# Patient Record
Sex: MALE | Race: OTHER | HISPANIC OR LATINO
[De-identification: names, ages, dates, MRNs, and addresses within clinical notes are randomized per-mention and may not be internally consistent; named-entity substitution may affect disease eponyms.]

---

## 2017-07-25 ENCOUNTER — APPOINTMENT (OUTPATIENT)
Dept: ORTHOPEDIC SURGERY | Facility: CLINIC | Age: 47
End: 2017-07-25

## 2017-07-25 DIAGNOSIS — M47.816 SPONDYLOSIS W/OUT MYELOPATHY OR RADICULOPATHY, LUMBAR REGION: ICD-10-CM

## 2017-08-07 ENCOUNTER — APPOINTMENT (OUTPATIENT)
Dept: ORTHOPEDIC SURGERY | Facility: CLINIC | Age: 47
End: 2017-08-07

## 2017-11-07 ENCOUNTER — APPOINTMENT (OUTPATIENT)
Dept: PULMONOLOGY | Facility: CLINIC | Age: 47
End: 2017-11-07
Payer: MEDICAID

## 2017-11-07 VITALS
WEIGHT: 281 LBS | BODY MASS INDEX: 46.76 KG/M2 | OXYGEN SATURATION: 97 % | SYSTOLIC BLOOD PRESSURE: 120 MMHG | HEART RATE: 74 BPM | DIASTOLIC BLOOD PRESSURE: 84 MMHG

## 2017-11-07 DIAGNOSIS — Z01.811 ENCOUNTER FOR PREPROCEDURAL RESPIRATORY EXAMINATION: ICD-10-CM

## 2017-11-07 PROCEDURE — 94729 DIFFUSING CAPACITY: CPT

## 2017-11-07 PROCEDURE — 94010 BREATHING CAPACITY TEST: CPT

## 2017-11-07 PROCEDURE — 94727 GAS DIL/WSHOT DETER LNG VOL: CPT

## 2017-11-07 PROCEDURE — 99214 OFFICE O/P EST MOD 30 MIN: CPT | Mod: 25

## 2017-11-07 PROCEDURE — 85018 HEMOGLOBIN: CPT | Mod: QW

## 2018-05-22 ENCOUNTER — APPOINTMENT (OUTPATIENT)
Dept: PULMONOLOGY | Facility: CLINIC | Age: 48
End: 2018-05-22

## 2019-05-30 ENCOUNTER — OTHER (OUTPATIENT)
Age: 49
End: 2019-05-30

## 2019-05-30 DIAGNOSIS — M25.569 PAIN IN UNSPECIFIED KNEE: ICD-10-CM

## 2019-06-06 ENCOUNTER — OTHER (OUTPATIENT)
Age: 49
End: 2019-06-06

## 2019-06-06 ENCOUNTER — APPOINTMENT (OUTPATIENT)
Dept: ORTHOPEDIC SURGERY | Facility: CLINIC | Age: 49
End: 2019-06-06
Payer: MEDICAID

## 2019-06-06 VITALS
HEIGHT: 65 IN | BODY MASS INDEX: 46.82 KG/M2 | DIASTOLIC BLOOD PRESSURE: 79 MMHG | WEIGHT: 281 LBS | SYSTOLIC BLOOD PRESSURE: 133 MMHG | HEART RATE: 60 BPM

## 2019-06-06 PROCEDURE — 99203 OFFICE O/P NEW LOW 30 MIN: CPT | Mod: 25

## 2019-06-06 PROCEDURE — S0020: CPT

## 2019-06-06 PROCEDURE — 20610 DRAIN/INJ JOINT/BURSA W/O US: CPT

## 2019-06-06 PROCEDURE — 73564 X-RAY EXAM KNEE 4 OR MORE: CPT

## 2019-06-06 NOTE — PHYSICAL EXAM
[de-identified] : The patient appears well nourished  and in no apparent distress.  The patient is alert and oriented to person, place, and time.   Affect and mood appear normal. The head is normocephalic and atraumatic.  The eyes reveal normal sclera and extra ocular muscles are intact. The tongue is midline with no apparent lesions.  Skin shows normal turgor with no evidence of eczema or psoriasis.  No respiratory distress noted.  Sensation grossly intact.\par   [de-identified] : Exam of the left knee shows a small effusion, full extension, varus alignment, flexion of 105 degrees.  5/5 motor strength bilaterally distally. Sensation intact distally.  [de-identified] : Xray- 4 views of the left knee shows moderate medial compartment arthritis of the left knee.

## 2019-06-06 NOTE — PROCEDURE
[de-identified] : Using sterile technique, 2cc of depomedrol 40mg/ml, 4cc of 1% plain lidocaine, and 2 cc 0.25% marcaine was drawn up into a sterile syringe. The left knee was then sterilely prepped with chlorhexidine. Ethyl chloride spray was used to anesthetize the skin and subQ tissue. The depomedrol/lidocaine/marcaine mixture was then injected into the knee joint in the anterolateral position. The patient tolerated the procedure well without difficulty. The patient was given instructions on the use of ice and anti-inflammatories post injection site soreness.\par \par

## 2019-06-06 NOTE — ADDENDUM
[FreeTextEntry1] : This note was authored by Dhruv Doran working as a medical scribe for Dr. Leonardo Farrell. The note was reviewed, edited, and revised by Dr. Leonardo Farrell whom is in agreement with the exam findings, imaging findings, and treatment plan. 06/06/2019.

## 2019-06-06 NOTE — DISCUSSION/SUMMARY
[de-identified] : The patient is a 49 year old male with moderate medial compartment arthritis of the left knee. Conservative options were discussed. He was given a cortisone injection in the left knee today. He was given a prescription for physical therapy. We discussed the future possibility for Visco Supplementation if pain does not respond to cortisone, PT and NSAIDs. He was recommended to take ibuprofen as needed for painful flares. He may follow up as needed.

## 2019-06-06 NOTE — HISTORY OF PRESENT ILLNESS
[de-identified] : The patient is a 49 year old male being seen for evaluation of his left knee. He denies injury, trauma, or change of activity. He reports a three week duration of pain. He reports pain is most notable over the medial aspect of the knee. He reports pain is worse with weightbearing. He reports icing the knee with minimal relief. He reports taking ibuprofen with moderate relief. He comes in today for evaluation of his left knee and for treatment options.

## 2019-06-11 ENCOUNTER — OTHER (OUTPATIENT)
Age: 49
End: 2019-06-11

## 2019-07-24 ENCOUNTER — APPOINTMENT (OUTPATIENT)
Dept: PULMONOLOGY | Facility: CLINIC | Age: 49
End: 2019-07-24
Payer: MEDICAID

## 2019-07-24 VITALS — SYSTOLIC BLOOD PRESSURE: 118 MMHG | DIASTOLIC BLOOD PRESSURE: 78 MMHG | OXYGEN SATURATION: 95 % | HEART RATE: 72 BPM

## 2019-07-24 VITALS — HEIGHT: 65 IN | BODY MASS INDEX: 44.48 KG/M2 | WEIGHT: 267 LBS

## 2019-07-24 PROCEDURE — 99407 BEHAV CHNG SMOKING > 10 MIN: CPT

## 2019-07-24 PROCEDURE — 99214 OFFICE O/P EST MOD 30 MIN: CPT

## 2019-09-25 ENCOUNTER — APPOINTMENT (OUTPATIENT)
Dept: PULMONOLOGY | Facility: CLINIC | Age: 49
End: 2019-09-25

## 2020-02-10 ENCOUNTER — APPOINTMENT (OUTPATIENT)
Dept: ORTHOPEDIC SURGERY | Facility: CLINIC | Age: 50
End: 2020-02-10
Payer: MEDICAID

## 2020-02-10 VITALS
SYSTOLIC BLOOD PRESSURE: 117 MMHG | BODY MASS INDEX: 44.48 KG/M2 | HEART RATE: 77 BPM | DIASTOLIC BLOOD PRESSURE: 77 MMHG | WEIGHT: 267 LBS | HEIGHT: 65 IN

## 2020-02-10 PROCEDURE — 99213 OFFICE O/P EST LOW 20 MIN: CPT | Mod: 25

## 2020-02-10 PROCEDURE — 73564 X-RAY EXAM KNEE 4 OR MORE: CPT | Mod: 26,LT

## 2020-02-10 PROCEDURE — 20610 DRAIN/INJ JOINT/BURSA W/O US: CPT | Mod: LT

## 2020-02-10 NOTE — PHYSICAL EXAM
[de-identified] : The patient appears well nourished  and in no apparent distress.  The patient is alert and oriented to person, place, and time.   Affect and mood appear normal. The head is normocephalic and atraumatic.  The eyes reveal normal sclera and extra ocular muscles are intact. The tongue is midline with no apparent lesions.  Skin shows normal turgor with no evidence of eczema or psoriasis.  No respiratory distress noted.  Sensation grossly intact.\par   [de-identified] : Xray- 4 views of the left knee shows moderate medial compartment arthritis of the left knee. [de-identified] : Exam of the left knee shows a small effusion, full extension, varus alignment, flexion of 105 degrees.  No gross instability. Extensor mechanism is intact. Quadriceps strength is adequate.5/5 motor strength bilaterally distally. Sensation intact distally.

## 2020-02-10 NOTE — HISTORY OF PRESENT ILLNESS
[de-identified] : Patient is a 49-year-old male who presents for followup of his left knee. He was seen in the office on June 6, 2019 for moderate left knee osteoarthritis. He was given an intra-articular cortisone injection which offered 3-4 months of relief. He reports performing physical therapy but did not continue with his home exercise program. Pain has now returned to the knee. Pain is notable of the medial aspect of the knee with radiation posteriorly. He's been taking Tylenol with no significant relief.

## 2020-02-10 NOTE — PROCEDURE
[de-identified] : Using sterile technique, 2cc of depomedrol 40mg/ml, 4cc of 1% plain lidocaine, and 2 cc 0.25% marcaine was drawn up into a sterile syringe.  The left knee was then sterilely prepped with chlorhexidine. Ethyl chloride spray was used to anesthesize the skin and subQ tissue.  The depomedrol/lidocaine/marcaine mixture was then injected into the knee joint in the anterolateral position.  The patient tolerated the procedure well without difficulty.  The patient was given instructions on the use of ice and anti-inflammatories post injection site soreness

## 2020-02-10 NOTE — DISCUSSION/SUMMARY
[de-identified] : The patient is a 49-year-old male with moderate medial compartment arthritis of the left knee. Conservative options were discussed. We had a long discussion on the importance of weight loss. His knee was injected with cortisone today for pain relief.I recommended a course of Mobic.  The patient was given a prescription for the Mobic with directions.  They were instructed to stop the medicine and call the office if there are any adverse reaction to the medicine.  They were also instructed to consult with their primary care doctor prior to starting the medication. We discussed Visco supplementation and this was ordered today. I recommended physical therapy and a prescription was provided. He was made aware of any changes in his clinical condition that would warrant urgent evaluation of intervention. He will followup once we've received his Visco supplementation. He notes good understanding and agreement with the plan of care.

## 2020-08-20 ENCOUNTER — APPOINTMENT (OUTPATIENT)
Dept: ORTHOPEDIC SURGERY | Facility: CLINIC | Age: 50
End: 2020-08-20
Payer: MEDICAID

## 2020-08-20 VITALS
HEIGHT: 65 IN | DIASTOLIC BLOOD PRESSURE: 85 MMHG | BODY MASS INDEX: 44.48 KG/M2 | HEART RATE: 85 BPM | WEIGHT: 267 LBS | SYSTOLIC BLOOD PRESSURE: 124 MMHG

## 2020-08-20 PROCEDURE — 73564 X-RAY EXAM KNEE 4 OR MORE: CPT | Mod: LT

## 2020-08-20 PROCEDURE — 99214 OFFICE O/P EST MOD 30 MIN: CPT | Mod: 25

## 2020-08-20 PROCEDURE — 20610 DRAIN/INJ JOINT/BURSA W/O US: CPT | Mod: LT

## 2020-08-20 NOTE — DISCUSSION/SUMMARY
[de-identified] : Patient is a 50-year-old male with left knee moderate to severe osteoarthritis.  Conservative options were discussed.  Patient received a Depo-Medrol with lidocaine injection to the left knee today in office using sterile technique, and tolerated well.  He was advised on proper ice and elevation at home.  I have ordered Visco supplementation for the left knee as well.  He was given a prescription for physical therapy.  A prescription for meloxicam was provided for an anti-inflammatory.  Patient advised to call back if he does not hear from us in regards to the Visco supplementation.  Follow-up recommended once gel series is approved.\par \par

## 2020-08-20 NOTE — HISTORY OF PRESENT ILLNESS
[de-identified] : Patient is a 50-year-old male presenting for follow-up evaluation of left knee pain.  He has had longstanding left knee pain which is now progressively worsening.  He generalizes the left knee pain to the anterior, medial, and lateral aspect of the knee.  He admits to intermittent swelling of the knee.  He denies locking or buckling.  He notes that his symptoms are worse with weight-bear activity including walking any distance, rising proceed position, and using stairs.  Patient states he likes to play soccer and due to his pain he is unable to do so.  He feels that his pain is limiting his quality of life.  He in the past is been diagnosed with left knee osteoarthritis and has been treated conservatively.  He has tried physical therapy without significant relief.  He is tried anti-inflammatories and Tylenol without significant relief.  He has received steroid injections in the past which have worked seem to wear off after a few weeks.  He wishes to receive another steroid injection today as he is in a lot of pain but would like to try to order the gel shots as he has not tried these yet.\par Patient denies any diabetes, heart issues, or lung issues.

## 2020-08-20 NOTE — PHYSICAL EXAM
[de-identified] : Multi body exam \par The patient appears well nourished and in no apparent distress. The patient is alert and oriented to person, place, and time. Affect and mood appear normal. The head is normocephalic and atraumatic. The eyes reveal normal sclera and extra ocular muscles are intact. The tongue is midline with no apparent lesions. Skin shows normal turgor with no evidence of eczema or psoriasis. No respiratory distress noted. Sensation grossly intact.\par   [de-identified] : Exam of left knee: Skin is from limits, range of motion of the knee is 0-110 with pain at deep flexion.  There is moderate swelling of the knee.  There is positive Vazquez's test.

## 2020-08-20 NOTE — PROCEDURE
[de-identified] : Using sterile technique, 2cc of depomedrol 40mg/ml, 4cc of 1% plain lidocaine, and 2 cc 0.25% marcaine was drawn up into a sterile syringe. The left knee was then sterilely prepped with chlorhexidine. Ethyl chloride spray was used to anesthetize the skin and subQ tissue. The depomedrol/lidocaine/marcaine mixture was then injected into the knee joint in the anterolateral position. The patient tolerated the procedure well without difficulty. The patient was given instructions on the use of ice and anti-inflammatories post injection site soreness.

## 2020-10-23 ENCOUNTER — APPOINTMENT (OUTPATIENT)
Dept: ORTHOPEDIC SURGERY | Facility: CLINIC | Age: 50
End: 2020-10-23
Payer: MEDICAID

## 2020-10-23 VITALS — HEIGHT: 65 IN | BODY MASS INDEX: 44.48 KG/M2 | WEIGHT: 267 LBS

## 2020-10-23 PROCEDURE — 20610 DRAIN/INJ JOINT/BURSA W/O US: CPT | Mod: LT

## 2020-10-23 PROCEDURE — 99213 OFFICE O/P EST LOW 20 MIN: CPT | Mod: 25

## 2020-10-23 PROCEDURE — 99072 ADDL SUPL MATRL&STAF TM PHE: CPT

## 2020-10-23 NOTE — DISCUSSION/SUMMARY
[de-identified] : Patient is a 50-year-old male with left knee osteoarthritis.  Consider options were discussed.  Patient received the first of 3 Euflexxa injections to the left knee today in office under sterile technique and tolerated well.  Patient will ice and elevate at home.  All questions were answered.  Follow-up recommended in 1 week for second injection.

## 2020-10-23 NOTE — HISTORY OF PRESENT ILLNESS
[de-identified] : Patient is a 50-year-old male presenting for follow-up evaluation of left knee pain.  He has had longstanding left knee pain which is now progressively worsening.  He generalizes the left knee pain to the anterior, medial, and lateral aspect of the knee.  He admits to intermittent swelling of the knee.  He denies locking or buckling.  He notes that his symptoms are worse with weight-bear activity including walking any distance, rising proceed position, and using stairs.  Patient states he likes to play soccer and due to his pain he is unable to do so.  He feels that his pain is limiting his quality of life.  He in the past is been diagnosed with left knee osteoarthritis and has been treated conservatively.  He has tried physical therapy without significant relief.  He is tried anti-inflammatories and Tylenol without significant relief. Patient was seen in office two months ago with the same complaints and received a depo-medrol injection at that time to the left knee with some improvement. Gel injections were also ordered and patient presents today for the first of the series.

## 2020-10-23 NOTE — REASON FOR VISIT
[Follow-Up Visit] : a follow-up visit for [Other: ____] : [unfilled] [FreeTextEntry2] : left knee Euflexxa inj#1. Lot# [id number], Expires on 2021/06/07.

## 2020-10-23 NOTE — PHYSICAL EXAM
[de-identified] : Multi body exam \par The patient appears well nourished and in no apparent distress. The patient is alert and oriented to person, place, and time. Affect and mood appear normal. The head is normocephalic and atraumatic. The eyes reveal normal sclera and extra ocular muscles are intact. The tongue is midline with no apparent lesions. Skin shows normal turgor with no evidence of eczema or psoriasis. No respiratory distress noted. Sensation grossly intact.\par   [de-identified] : Exam of left knee: Skin is from limits, range of motion of the knee is 0-110 with pain at deep flexion. There is moderate swelling of the knee.  There is positive Vazquez's test. [de-identified] : X

## 2020-10-23 NOTE — PROCEDURE
[de-identified] : Allergies: The patient denies allergies to medications and has no allergies to chicken,eggs, or feathers.\par Procedure: The patient has been identified by name and date of birth. Patient confirms that we are treating the left knee today.\par The knee was prepped in the usual sterile fashion. The areas were cleansed with chlorhexadine, then sprayed with ethyl chloride. The patient was then injected with the Euflexxa into the left knee. The patient tolerated the procedure well. The medication was delivered aseptically and atraumatically.\par Diagnosis: Osteoarthritis of the left knee\par Treatment: The patient was advised on the activities for today. I gave the patient instructions on postinjection ice and analgesia.\par

## 2020-10-29 ENCOUNTER — APPOINTMENT (OUTPATIENT)
Dept: ORTHOPEDIC SURGERY | Facility: CLINIC | Age: 50
End: 2020-10-29
Payer: MEDICAID

## 2020-10-29 VITALS — WEIGHT: 267 LBS | HEIGHT: 65 IN | BODY MASS INDEX: 44.48 KG/M2

## 2020-10-29 PROCEDURE — 20610 DRAIN/INJ JOINT/BURSA W/O US: CPT | Mod: LT

## 2020-10-29 PROCEDURE — 99072 ADDL SUPL MATRL&STAF TM PHE: CPT

## 2020-11-05 ENCOUNTER — APPOINTMENT (OUTPATIENT)
Dept: ORTHOPEDIC SURGERY | Facility: CLINIC | Age: 50
End: 2020-11-05
Payer: MEDICAID

## 2020-11-05 VITALS
BODY MASS INDEX: 44.48 KG/M2 | WEIGHT: 267 LBS | DIASTOLIC BLOOD PRESSURE: 82 MMHG | HEIGHT: 65 IN | SYSTOLIC BLOOD PRESSURE: 122 MMHG | HEART RATE: 82 BPM

## 2020-11-05 PROCEDURE — 99072 ADDL SUPL MATRL&STAF TM PHE: CPT

## 2020-11-05 PROCEDURE — 20610 DRAIN/INJ JOINT/BURSA W/O US: CPT | Mod: LT

## 2020-11-05 NOTE — REASON FOR VISIT
[Follow-Up Visit] : a follow-up visit for [Other: ____] : [unfilled] [FreeTextEntry2] : left knee Euflexxa inj#3. Lot# [id number], Expires on 2021/06/07. \par

## 2020-11-05 NOTE — REASON FOR VISIT
[Follow-Up Visit] : a follow-up visit for [Other: ____] : [unfilled] [FreeTextEntry2] : left knee Euflexxa inj#2. Lot# [id number], Expires on 2021/06/07. \par

## 2020-11-05 NOTE — HISTORY OF PRESENT ILLNESS
[de-identified] : The patient is here today for a Euflexxa injection for the left knee. The patient is having osteoarthritic symptoms. The patient was seen previously and was indicated for Euflexxa injections.\par Allergies: The patient denies allergies to medications and has no allergies to chicken,eggs, or feathers.\par Procedure: The patient has been identified by name and date of birth. Patient confirms that we are treating the left knee today.\par The knee was prepped in the usual sterile fashion. The areas were cleansed with chlorhexadine, then sprayed with ethyl chloride. The patient was then injected with the Euflexxa into the left knee. The patient tolerated the procedure well. The medication was delivered aseptically and atraumatically.\par Diagnosis: Osteoarthritis of the left knee\par Treatment: The patient was advised on the activities for today. I gave the patient instructions on postinjection ice and analgesia.\par  Follow up recommended in 6 weeks.

## 2020-11-05 NOTE — HISTORY OF PRESENT ILLNESS
[de-identified] : The patient is here today for a Euflexxa injection for the left knee. The patient is having osteoarthritic symptoms. The patient was seen previously and was indicated for Euflexxa injections.\par Allergies: The patient denies allergies to medications and has no allergies to chicken,eggs, or feathers.\par Procedure: The patient has been identified by name and date of birth. Patient confirms that we are treating the left knee today.\par The knee was prepped in the usual sterile fashion. The areas were cleansed with chlorhexadine, then sprayed with ethyl chloride. The patient was then injected with the Euflexxa into the left knee. The patient tolerated the procedure well. The medication was delivered aseptically and atraumatically.\par Diagnosis: Osteoarthritis of the left knee\par Treatment: The patient was advised on the activities for today. I gave the patient instructions on postinjection ice and analgesia.\par  Follow up recommended in one week.

## 2020-12-18 ENCOUNTER — APPOINTMENT (OUTPATIENT)
Dept: ORTHOPEDIC SURGERY | Facility: CLINIC | Age: 50
End: 2020-12-18
Payer: MEDICAID

## 2020-12-18 VITALS — WEIGHT: 267 LBS | BODY MASS INDEX: 44.48 KG/M2 | HEIGHT: 65 IN

## 2020-12-18 DIAGNOSIS — M17.12 UNILATERAL PRIMARY OSTEOARTHRITIS, LEFT KNEE: ICD-10-CM

## 2020-12-18 PROCEDURE — 99215 OFFICE O/P EST HI 40 MIN: CPT

## 2020-12-18 PROCEDURE — 99072 ADDL SUPL MATRL&STAF TM PHE: CPT

## 2020-12-18 RX ORDER — HYALURONATE SODIUM 20 MG/2 ML
20 SYRINGE (ML) INTRAARTICULAR
Qty: 1 | Refills: 0 | Status: DISCONTINUED | OUTPATIENT
Start: 2020-02-10 | End: 2020-12-18

## 2020-12-18 RX ORDER — HYALURONATE SODIUM 20 MG/2 ML
20 SYRINGE (ML) INTRAARTICULAR
Qty: 1 | Refills: 0 | Status: DISCONTINUED | OUTPATIENT
Start: 2020-08-20 | End: 2020-12-18

## 2020-12-18 RX ORDER — MELOXICAM 7.5 MG/1
7.5 TABLET ORAL TWICE DAILY
Qty: 60 | Refills: 0 | Status: DISCONTINUED | COMMUNITY
Start: 2020-02-10 | End: 2020-12-18

## 2020-12-18 NOTE — PHYSICAL EXAM
[de-identified] : The patient appears well nourished  and in no apparent distress.  The patient is alert and oriented to person, place, and time.   Affect and mood appear normal. The head is normocephalic and atraumatic.  The eyes reveal normal sclera and extra ocular muscles are intact. The tongue is midline with no apparent lesions.  Skin shows normal turgor with no evidence of eczema or psoriasis.  No respiratory distress noted.  Sensation grossly intact.\par   [de-identified] : Exam of the left knee shows -10 to 108 degrees of flexion measured with a goniometer. 5/5 motor strength bilaterally distally. Sensation intact distally.  [de-identified] : Xray- 4 views of the left knee shows bone on bone varus arthritis of the left knee.

## 2020-12-18 NOTE — DISCUSSION/SUMMARY
[de-identified] : The patient is a 50 year old male with bone on bone varus arthritis of the left knee. Based upon the patients continued symptoms and failure to respond to conservative treatment I have recommended a left total knee replacement for the patient.  A discussion was had with the patient regarding a left total knee replacement. A long discussion was had with the patient as what the total joint replacement would entail. A model was used to demonstrate the operation and to discuss bearing surfaces of the implants. The hospitalization and rehabilitation were discussed.  The use of perioperative antibiotics and DVT prophylaxis were discussed. The risks, benefits and alternatives to surgical intervention were discussed at length with the patient. Specific risks discussed included: infection, wound breakdown, numbness and damage to nerves, tendon, muscle, arteries or other blood vessels. The possibility of recurrent pain, no improvement in pain and actual worsening of the pain were also mentioned in conversation with the patient. Medical complications related to the patient's general medical health including deep vein thrombosis, pulmonary embolus, heart attack, stroke, death and other complications from anesthesia were discussed as well. The patient was told that we will take steps to minimize these risks by using sterile technique, antibiotics and DVT prophylaxis when appropriate and following the patient postoperatively in the clinic setting to monitor progress. The benefits of surgery were discussed with the patient including the potential to improve the current clinical condition through operative intervention. Alternatives to surgical intervention include continued conservative management which may yield less than optimal results in this particular patient. All questions were answered to the satisfaction of the patient. Models were used as an educational tool. We did discuss implant choices and fixation, with shared decision making with the patient. \par \par  We discussed that his knee replacement will be done with robotic assistance to enhance accuracy and dynamic joint balancing. \par \par I had a long discussion with the patient regarding increased risk of perioperative complications with joint replacement in patients with obesity.  We discussed that this may include but is not limited to wound healing complications, DVT and PE, and increased infection rate.  At this point the patient's pain is debilitating and it is significantly limiting their daily function and quality of life.  The patient acknowledges this and is willing to accept this increased risk to proceed with joint replacement surgery.

## 2020-12-18 NOTE — ADDENDUM
[FreeTextEntry1] : This note was authored by Dhruv Doran working as a medical scribe for Dr. Leonardo Farrell. The note was reviewed, edited, and revised by Dr. Leonardo Farrell whom is in agreement with the exam findings, imaging findings, and treatment plan. 12/18/2020.

## 2020-12-29 ENCOUNTER — OUTPATIENT (OUTPATIENT)
Dept: OUTPATIENT SERVICES | Facility: HOSPITAL | Age: 50
LOS: 1 days | End: 2020-12-29
Payer: MEDICAID

## 2020-12-29 ENCOUNTER — APPOINTMENT (OUTPATIENT)
Dept: CT IMAGING | Facility: CLINIC | Age: 50
End: 2020-12-29
Payer: MEDICAID

## 2020-12-29 DIAGNOSIS — M17.12 UNILATERAL PRIMARY OSTEOARTHRITIS, LEFT KNEE: ICD-10-CM

## 2020-12-29 DIAGNOSIS — Z98.89 OTHER SPECIFIED POSTPROCEDURAL STATES: Chronic | ICD-10-CM

## 2020-12-29 PROCEDURE — 73700 CT LOWER EXTREMITY W/O DYE: CPT

## 2020-12-29 PROCEDURE — 73700 CT LOWER EXTREMITY W/O DYE: CPT | Mod: 26,LT

## 2021-01-19 ENCOUNTER — OUTPATIENT (OUTPATIENT)
Dept: OUTPATIENT SERVICES | Facility: HOSPITAL | Age: 51
LOS: 1 days | End: 2021-01-19
Payer: MEDICAID

## 2021-01-19 ENCOUNTER — RESULT REVIEW (OUTPATIENT)
Age: 51
End: 2021-01-19

## 2021-01-19 VITALS
RESPIRATION RATE: 20 BRPM | DIASTOLIC BLOOD PRESSURE: 82 MMHG | HEART RATE: 60 BPM | HEIGHT: 64 IN | TEMPERATURE: 98 F | SYSTOLIC BLOOD PRESSURE: 124 MMHG | WEIGHT: 277.34 LBS

## 2021-01-19 DIAGNOSIS — Z98.84 BARIATRIC SURGERY STATUS: Chronic | ICD-10-CM

## 2021-01-19 DIAGNOSIS — Z98.890 OTHER SPECIFIED POSTPROCEDURAL STATES: Chronic | ICD-10-CM

## 2021-01-19 DIAGNOSIS — Z01.818 ENCOUNTER FOR OTHER PREPROCEDURAL EXAMINATION: ICD-10-CM

## 2021-01-19 DIAGNOSIS — M19.90 UNSPECIFIED OSTEOARTHRITIS, UNSPECIFIED SITE: ICD-10-CM

## 2021-01-19 DIAGNOSIS — G47.30 SLEEP APNEA, UNSPECIFIED: ICD-10-CM

## 2021-01-19 DIAGNOSIS — Z29.9 ENCOUNTER FOR PROPHYLACTIC MEASURES, UNSPECIFIED: ICD-10-CM

## 2021-01-19 DIAGNOSIS — Z98.89 OTHER SPECIFIED POSTPROCEDURAL STATES: Chronic | ICD-10-CM

## 2021-01-19 DIAGNOSIS — Z13.89 ENCOUNTER FOR SCREENING FOR OTHER DISORDER: ICD-10-CM

## 2021-01-19 LAB
A1C WITH ESTIMATED AVERAGE GLUCOSE RESULT: 5.7 % — HIGH (ref 4–5.6)
ALBUMIN SERPL ELPH-MCNC: 4.1 G/DL — SIGNIFICANT CHANGE UP (ref 3.3–5.2)
ALP SERPL-CCNC: 60 U/L — SIGNIFICANT CHANGE UP (ref 40–120)
ALT FLD-CCNC: 39 U/L — SIGNIFICANT CHANGE UP
ANION GAP SERPL CALC-SCNC: 8 MMOL/L — SIGNIFICANT CHANGE UP (ref 5–17)
APTT BLD: 30.7 SEC — SIGNIFICANT CHANGE UP (ref 27.5–35.5)
AST SERPL-CCNC: 27 U/L — SIGNIFICANT CHANGE UP
BASOPHILS # BLD AUTO: 0.05 K/UL — SIGNIFICANT CHANGE UP (ref 0–0.2)
BASOPHILS NFR BLD AUTO: 0.6 % — SIGNIFICANT CHANGE UP (ref 0–2)
BILIRUB SERPL-MCNC: 0.2 MG/DL — LOW (ref 0.4–2)
BLD GP AB SCN SERPL QL: SIGNIFICANT CHANGE UP
BUN SERPL-MCNC: 13 MG/DL — SIGNIFICANT CHANGE UP (ref 8–20)
CALCIUM SERPL-MCNC: 9.3 MG/DL — SIGNIFICANT CHANGE UP (ref 8.6–10.2)
CHLORIDE SERPL-SCNC: 103 MMOL/L — SIGNIFICANT CHANGE UP (ref 98–107)
CO2 SERPL-SCNC: 30 MMOL/L — HIGH (ref 22–29)
CREAT SERPL-MCNC: 0.86 MG/DL — SIGNIFICANT CHANGE UP (ref 0.5–1.3)
EOSINOPHIL # BLD AUTO: 0.31 K/UL — SIGNIFICANT CHANGE UP (ref 0–0.5)
EOSINOPHIL NFR BLD AUTO: 3.6 % — SIGNIFICANT CHANGE UP (ref 0–6)
ESTIMATED AVERAGE GLUCOSE: 117 MG/DL — HIGH (ref 68–114)
GLUCOSE SERPL-MCNC: 104 MG/DL — HIGH (ref 70–99)
HCT VFR BLD CALC: 46.9 % — SIGNIFICANT CHANGE UP (ref 39–50)
HGB BLD-MCNC: 15.4 G/DL — SIGNIFICANT CHANGE UP (ref 13–17)
IMM GRANULOCYTES NFR BLD AUTO: 0.9 % — SIGNIFICANT CHANGE UP (ref 0–1.5)
INR BLD: 1.03 RATIO — SIGNIFICANT CHANGE UP (ref 0.88–1.16)
LYMPHOCYTES # BLD AUTO: 2.99 K/UL — SIGNIFICANT CHANGE UP (ref 1–3.3)
LYMPHOCYTES # BLD AUTO: 34.9 % — SIGNIFICANT CHANGE UP (ref 13–44)
MCHC RBC-ENTMCNC: 28.4 PG — SIGNIFICANT CHANGE UP (ref 27–34)
MCHC RBC-ENTMCNC: 32.8 GM/DL — SIGNIFICANT CHANGE UP (ref 32–36)
MCV RBC AUTO: 86.4 FL — SIGNIFICANT CHANGE UP (ref 80–100)
MONOCYTES # BLD AUTO: 0.62 K/UL — SIGNIFICANT CHANGE UP (ref 0–0.9)
MONOCYTES NFR BLD AUTO: 7.2 % — SIGNIFICANT CHANGE UP (ref 2–14)
MRSA PCR RESULT.: SIGNIFICANT CHANGE UP
NEUTROPHILS # BLD AUTO: 4.52 K/UL — SIGNIFICANT CHANGE UP (ref 1.8–7.4)
NEUTROPHILS NFR BLD AUTO: 52.8 % — SIGNIFICANT CHANGE UP (ref 43–77)
PLATELET # BLD AUTO: 181 K/UL — SIGNIFICANT CHANGE UP (ref 150–400)
POTASSIUM SERPL-MCNC: 4.7 MMOL/L — SIGNIFICANT CHANGE UP (ref 3.5–5.3)
POTASSIUM SERPL-SCNC: 4.7 MMOL/L — SIGNIFICANT CHANGE UP (ref 3.5–5.3)
PROT SERPL-MCNC: 7.3 G/DL — SIGNIFICANT CHANGE UP (ref 6.6–8.7)
PROTHROM AB SERPL-ACNC: 11.9 SEC — SIGNIFICANT CHANGE UP (ref 10.6–13.6)
RBC # BLD: 5.43 M/UL — SIGNIFICANT CHANGE UP (ref 4.2–5.8)
RBC # FLD: 12.9 % — SIGNIFICANT CHANGE UP (ref 10.3–14.5)
S AUREUS DNA NOSE QL NAA+PROBE: SIGNIFICANT CHANGE UP
SODIUM SERPL-SCNC: 141 MMOL/L — SIGNIFICANT CHANGE UP (ref 135–145)
WBC # BLD: 8.57 K/UL — SIGNIFICANT CHANGE UP (ref 3.8–10.5)
WBC # FLD AUTO: 8.57 K/UL — SIGNIFICANT CHANGE UP (ref 3.8–10.5)

## 2021-01-19 PROCEDURE — 93005 ELECTROCARDIOGRAM TRACING: CPT

## 2021-01-19 PROCEDURE — 71046 X-RAY EXAM CHEST 2 VIEWS: CPT | Mod: 26

## 2021-01-19 PROCEDURE — G0463: CPT

## 2021-01-19 PROCEDURE — 71046 X-RAY EXAM CHEST 2 VIEWS: CPT

## 2021-01-19 PROCEDURE — 93010 ELECTROCARDIOGRAM REPORT: CPT

## 2021-01-19 RX ORDER — SODIUM CHLORIDE 9 MG/ML
3 INJECTION INTRAMUSCULAR; INTRAVENOUS; SUBCUTANEOUS EVERY 8 HOURS
Refills: 0 | Status: DISCONTINUED | OUTPATIENT
Start: 2021-02-08 | End: 2021-02-09

## 2021-01-19 NOTE — H&P PST ADULT - HISTORY OF PRESENT ILLNESS
50  year old obese male presents with c/o  left knee pain for over 2 years . Ambulates without assistance , pain with walking and stairs, took meloxicam in past with some relief  did not refill now  taking tylenol as needed with mild relief. Xray done and arthritis noted and replacement recommended. 7/10 pain today .

## 2021-01-19 NOTE — H&P PST ADULT - ASSESSMENT
Pleasant 50 yr old male in NAD presents with history of obesity , s/p gastric sleeve 2017  lost 55 lb , sleep apnea with cpap use nightly  And left knee pain from arthritis. ACtivity is limited , ambulates without assistance Pt is scheduled for left knee replacement with DR. Farrell .   Worked as a   but currently unemployed due to [employer], pt had Covid in 2020 and states he recovered at home.  lives with wife.   OPIOID RISK TOOL    ETTA EACH BOX THAT APPLIES AND ADD TOTALS AT THE END    FAMILY HISTORY OF SUBSTANCE ABUSE                 FEMALE         MALE                                                Alcohol                             [  ]1 pt          [  ]3pts                                               Illegal Durgs                     [  ]2 pts        [  ]3pts                                               Rx Drugs                           [  ]4 pts        [  ]4 pts    PERSONAL HISTORY OF SUBSTANCE ABUSE                                                                                          Alcohol                             [  ]3 pts       [  ]3 pts                                               Illegal Durgs                     [  ]4 pts        [  X]4 pts                                               Rx Drugs                           [  ]5 pts        [  ]5 pts    AGE BETWEEN 16-45 YEARS                                      [  ]1 pt         [  ]1 pt    HISTORY OF PREADOLESCENT   SEXUAL ABUSE                                                             [  ]3 pts        [  ]0pts    PSYCHOLOGICAL DISEASE                     ADD, OCD, Bipolar, Schizophrenia        [  ]2 pts         [  ]2 pts                      Depression                                               [  ]1 pt           [  ]1 pt           SCORING TOTAL   (add numbers and type here)              (*4**)                                     A score of 3 or lower indicated LOW risk for future opiod abuse  A score of 4 to 7 indicated moderate risk for future opiod abuse  A score of 8 or higher indicates a high risk for opiod abuse  CAPRINI VTE 2.0 SCORE [CLOT updated 2019]    AGE RELATED RISK FACTORS                                                       MOBILITY RELATED FACTORS  [ X] Age 41-60 years                                            (1 Point)                    [ ] Bed rest                                                        (1 Point)  [ ] Age: 61-74 years                                           (2 Points)                  [ ] Plaster cast                                                   (2 Points)  [ ] Age= 75 years                                              (3 Points)                    [ ] Bed bound for more than 72 hours                 (2 Points)    DISEASE RELATED RISK FACTORS                                               GENDER SPECIFIC FACTORS  [ ] Edema in the lower extremities                       (1 Point)              [ ] Pregnancy                                                     (1 Point)  [ ] Varicose veins                                               (1 Point)                     [ ] Post-partum < 6 weeks                                   (1 Point)             X[ ] BMI > 25 Kg/m2                                            (1 Point)                     [ ] Hormonal therapy  or oral contraception          (1 Point)                 [ ] Sepsis (in the previous month)                        (1 Point)               [ ] History of pregnancy complications                 (1 point)  [ ] Pneumonia or serious lung disease                                               [ ] Unexplained or recurrent                     (1 Point)           (in the previous month)                               (1 Point)  [ ] Abnormal pulmonary function test                     (1 Point)                 SURGERY RELATED RISK FACTORS  [ ] Acute myocardial infarction                              (1 Point)               [ ]  Section                                             (1 Point)  [ ] Congestive heart failure (in the previous month)  (1 Point)      [ ] Minor surgery                                                  (1 Point)   [ ] Inflammatory bowel disease                             (1 Point)               [ ] Arthroscopic surgery                                        (2 Points)  [ ] Central venous access                                      (2 Points)                [ ] General surgery lasting more than 45 minutes (2 points)  [ ] Malignancy- Present or previous                   (2 Points)                [x ] Elective arthroplasty                                         (5 points)    [ ] Stroke (in the previous month)                          (5 Points)                                                                                                                                                           HEMATOLOGY RELATED FACTORS                                                 TRAUMA RELATED RISK FACTORS  [ ] Prior episodes of VTE                                     (3 Points)                [ ] Fracture of the hip, pelvis, or leg                       (5 Points)  [ ] Positive family history for VTE                         (3 Points)             [ ] Acute spinal cord injury (in the previous month)  (5 Points)  [ ] Prothrombin 66055 A                                     (3 Points)               [ ] Paralysis  (less than 1 month)                             (5 Points)  [ ] Factor V Leiden                                             (3 Points)                  [ ] Multiple Trauma within 1 month                        (5 Points)  [ ] Lupus anticoagulants                                     (3 Points)                                                           [ ] Anticardiolipin antibodies                               (3 Points)                                                       [ ] High homocysteine in the blood                      (3 Points)                                             [ ] Other congenital or acquired thrombophilia      (3 Points)                                                [ ] Heparin induced thrombocytopenia                  (3 Points)                                     Total Score [      7    ]

## 2021-01-19 NOTE — H&P PST ADULT - NSICDXPROBLEM_GEN_ALL_CORE_FT
PROBLEM DIAGNOSES  Problem: Sleep apnea  Assessment and Plan: bring cpap   medical clearance     Problem: Arthritis  Assessment and Plan: left knee replacement with DR. Jami Dickson     Problem: Screening for substance abuse  Assessment and Plan: ort 4    Problem: Need for prophylactic measure  Assessment and Plan: caprini score 7   surgical team please assess need for DVT prophylaxis

## 2021-01-19 NOTE — PATIENT PROFILE ADULT - NSPROHMSYMPCOND_GEN_A_NUR
Instructed pt on pre-op instructions/teaching, tips for safer surgery, pain management scale, pre-surgical infection prevention instructions, MRSA/MSSA instructions, Covid swab appt info (2/5), ERAS sheet, Online Joint Replacement Class info and book given, medical clearance pending with Dr Faust (1/19), bring CPAP machine DOS if needed in recovery. pt verbalized understanding of all instructions given. Instructed pt on pre-op instructions/teaching, tips for safer surgery, pain management scale, pre-surgical infection prevention instructions, MRSA/MSSA instructions, Covid swab appt info (2/5), ERAS sheet, Online Joint Replacement Class info and book given, medical clearance pending with Dr Faust (1/19), bring CPAP machine DOS if needed in recovery, smoking cessation [place] for tobacco control course info given, Diabetes club info given. Pt verbalized understanding of all instructions given.

## 2021-01-19 NOTE — H&P PST ADULT - NSICDXPASTSURGICALHX_GEN_ALL_CORE_FT
PAST SURGICAL HISTORY:  H/O arthroscopy left knee    H/O bariatric surgery gastric sleeve  2017  lost 55 lb    H/O hemorrhoidectomy     S/P appendectomy

## 2021-01-19 NOTE — H&P PST ADULT - NSSUBSTANCEUSE_GEN_ALL_CORE_SD
cocaine  in  past none in several years/caffeine cocaine  in  past none in several years/caffeine/street drug/inhalant/medication abuse

## 2021-01-19 NOTE — H&P PST ADULT - NSICDXPASTMEDICALHX_GEN_ALL_CORE_FT
PAST MEDICAL HISTORY:  Arthritis knees    Diabetes no meds    Fatigue after COVID-19 vaccination march 2020  lost smell still not recovered    GERD with apnea     Lower back pain     Sleep apnea CPAP  DR. Aponte

## 2021-01-20 PROBLEM — M19.90 UNSPECIFIED OSTEOARTHRITIS, UNSPECIFIED SITE: Chronic | Status: ACTIVE | Noted: 2021-01-19

## 2021-01-20 PROBLEM — R53.83 OTHER FATIGUE: Chronic | Status: ACTIVE | Noted: 2021-01-19

## 2021-01-26 ENCOUNTER — NON-APPOINTMENT (OUTPATIENT)
Age: 51
End: 2021-01-26

## 2021-01-26 ENCOUNTER — APPOINTMENT (OUTPATIENT)
Dept: CARDIOLOGY | Facility: CLINIC | Age: 51
End: 2021-01-26
Payer: MEDICAID

## 2021-01-26 VITALS
BODY MASS INDEX: 46.32 KG/M2 | HEIGHT: 65 IN | TEMPERATURE: 98 F | DIASTOLIC BLOOD PRESSURE: 84 MMHG | RESPIRATION RATE: 16 BRPM | SYSTOLIC BLOOD PRESSURE: 126 MMHG | HEART RATE: 74 BPM | WEIGHT: 278 LBS

## 2021-01-26 DIAGNOSIS — R94.31 ABNORMAL ELECTROCARDIOGRAM [ECG] [EKG]: ICD-10-CM

## 2021-01-26 DIAGNOSIS — E66.9 OBESITY, UNSPECIFIED: ICD-10-CM

## 2021-01-26 PROCEDURE — 93000 ELECTROCARDIOGRAM COMPLETE: CPT

## 2021-01-26 PROCEDURE — 99072 ADDL SUPL MATRL&STAF TM PHE: CPT

## 2021-01-26 PROCEDURE — 99204 OFFICE O/P NEW MOD 45 MIN: CPT

## 2021-01-26 RX ORDER — MELOXICAM 7.5 MG/1
7.5 TABLET ORAL
Qty: 60 | Refills: 0 | Status: DISCONTINUED | COMMUNITY
Start: 2020-08-20 | End: 2021-01-26

## 2021-01-27 ENCOUNTER — APPOINTMENT (OUTPATIENT)
Dept: CARDIOLOGY | Facility: CLINIC | Age: 51
End: 2021-01-27
Payer: MEDICAID

## 2021-01-27 PROCEDURE — 93306 TTE W/DOPPLER COMPLETE: CPT

## 2021-01-27 PROCEDURE — 99072 ADDL SUPL MATRL&STAF TM PHE: CPT

## 2021-01-27 RX ADMIN — PERFLUTREN MG/ML: 6.52 INJECTION, SUSPENSION INTRAVENOUS at 00:00

## 2021-01-28 DIAGNOSIS — Z01.818 ENCOUNTER FOR OTHER PREPROCEDURAL EXAMINATION: ICD-10-CM

## 2021-01-30 ENCOUNTER — APPOINTMENT (OUTPATIENT)
Dept: CARDIOLOGY | Facility: CLINIC | Age: 51
End: 2021-01-30
Payer: MEDICAID

## 2021-01-30 ENCOUNTER — MED ADMIN CHARGE (OUTPATIENT)
Age: 51
End: 2021-01-30

## 2021-01-30 DIAGNOSIS — R94.31 ABNORMAL ELECTROCARDIOGRAM [ECG] [EKG]: ICD-10-CM

## 2021-01-30 PROCEDURE — 78452 HT MUSCLE IMAGE SPECT MULT: CPT

## 2021-01-30 PROCEDURE — 93015 CV STRESS TEST SUPVJ I&R: CPT

## 2021-01-30 PROCEDURE — 99072 ADDL SUPL MATRL&STAF TM PHE: CPT

## 2021-01-30 PROCEDURE — A9500: CPT

## 2021-01-30 RX ADMIN — REGADENOSON 0 MG/5ML: 0.08 INJECTION, SOLUTION INTRAVENOUS at 00:00

## 2021-01-31 PROBLEM — R94.31 ABNORMAL ECG DURING EXERCISE STRESS TEST: Status: ACTIVE | Noted: 2021-01-31

## 2021-02-01 ENCOUNTER — OUTPATIENT (OUTPATIENT)
Dept: OUTPATIENT SERVICES | Facility: HOSPITAL | Age: 51
LOS: 1 days | End: 2021-02-01
Payer: MEDICAID

## 2021-02-01 DIAGNOSIS — Z98.84 BARIATRIC SURGERY STATUS: Chronic | ICD-10-CM

## 2021-02-01 DIAGNOSIS — Z98.890 OTHER SPECIFIED POSTPROCEDURAL STATES: Chronic | ICD-10-CM

## 2021-02-01 DIAGNOSIS — Z98.89 OTHER SPECIFIED POSTPROCEDURAL STATES: Chronic | ICD-10-CM

## 2021-02-01 PROCEDURE — G9005: CPT

## 2021-02-02 ENCOUNTER — APPOINTMENT (OUTPATIENT)
Dept: CARDIOLOGY | Facility: CLINIC | Age: 51
End: 2021-02-02

## 2021-02-04 VITALS — HEART RATE: 52 BPM | OXYGEN SATURATION: 98 % | TEMPERATURE: 97.6 F

## 2021-02-05 ENCOUNTER — APPOINTMENT (OUTPATIENT)
Dept: DISASTER EMERGENCY | Facility: CLINIC | Age: 51
End: 2021-02-05

## 2021-02-06 LAB — SARS-COV-2 N GENE NPH QL NAA+PROBE: NOT DETECTED

## 2021-02-07 ENCOUNTER — TRANSCRIPTION ENCOUNTER (OUTPATIENT)
Age: 51
End: 2021-02-07

## 2021-02-08 ENCOUNTER — TRANSCRIPTION ENCOUNTER (OUTPATIENT)
Age: 51
End: 2021-02-08

## 2021-02-08 ENCOUNTER — INPATIENT (INPATIENT)
Facility: HOSPITAL | Age: 51
LOS: 0 days | Discharge: HOME | DRG: 470 | End: 2021-02-09
Attending: ORTHOPAEDIC SURGERY | Admitting: ORTHOPAEDIC SURGERY
Payer: MEDICAID

## 2021-02-08 ENCOUNTER — APPOINTMENT (OUTPATIENT)
Dept: ORTHOPEDIC SURGERY | Facility: HOSPITAL | Age: 51
End: 2021-02-08

## 2021-02-08 VITALS
TEMPERATURE: 98 F | DIASTOLIC BLOOD PRESSURE: 76 MMHG | WEIGHT: 277.34 LBS | SYSTOLIC BLOOD PRESSURE: 141 MMHG | HEIGHT: 64 IN | RESPIRATION RATE: 16 BRPM | HEART RATE: 62 BPM

## 2021-02-08 DIAGNOSIS — E66.01 MORBID (SEVERE) OBESITY DUE TO EXCESS CALORIES: ICD-10-CM

## 2021-02-08 DIAGNOSIS — K21.9 GASTRO-ESOPHAGEAL REFLUX DISEASE WITHOUT ESOPHAGITIS: ICD-10-CM

## 2021-02-08 DIAGNOSIS — M17.12 UNILATERAL PRIMARY OSTEOARTHRITIS, LEFT KNEE: ICD-10-CM

## 2021-02-08 DIAGNOSIS — Z98.890 OTHER SPECIFIED POSTPROCEDURAL STATES: Chronic | ICD-10-CM

## 2021-02-08 DIAGNOSIS — E11.9 TYPE 2 DIABETES MELLITUS WITHOUT COMPLICATIONS: ICD-10-CM

## 2021-02-08 DIAGNOSIS — Z98.89 OTHER SPECIFIED POSTPROCEDURAL STATES: Chronic | ICD-10-CM

## 2021-02-08 DIAGNOSIS — F17.200 NICOTINE DEPENDENCE, UNSPECIFIED, UNCOMPLICATED: ICD-10-CM

## 2021-02-08 DIAGNOSIS — Z98.84 BARIATRIC SURGERY STATUS: Chronic | ICD-10-CM

## 2021-02-08 LAB
BLD GP AB SCN SERPL QL: SIGNIFICANT CHANGE UP
GLUCOSE BLDC GLUCOMTR-MCNC: 115 MG/DL — HIGH (ref 70–99)
GLUCOSE BLDC GLUCOMTR-MCNC: 149 MG/DL — HIGH (ref 70–99)
GLUCOSE BLDC GLUCOMTR-MCNC: 98 MG/DL — SIGNIFICANT CHANGE UP (ref 70–99)

## 2021-02-08 PROCEDURE — 27447 TOTAL KNEE ARTHROPLASTY: CPT | Mod: LT

## 2021-02-08 PROCEDURE — 73560 X-RAY EXAM OF KNEE 1 OR 2: CPT | Mod: 26,LT

## 2021-02-08 PROCEDURE — 27447 TOTAL KNEE ARTHROPLASTY: CPT | Mod: AS,LT

## 2021-02-08 PROCEDURE — 99222 1ST HOSP IP/OBS MODERATE 55: CPT

## 2021-02-08 PROCEDURE — 0055T BONE SRGRY CMPTR CT/MRI IMAG: CPT | Mod: LT

## 2021-02-08 RX ORDER — CELECOXIB 200 MG/1
400 CAPSULE ORAL ONCE
Refills: 0 | Status: COMPLETED | OUTPATIENT
Start: 2021-02-08 | End: 2021-02-08

## 2021-02-08 RX ORDER — OXYCODONE HYDROCHLORIDE 5 MG/1
10 TABLET ORAL
Refills: 0 | Status: DISCONTINUED | OUTPATIENT
Start: 2021-02-08 | End: 2021-02-09

## 2021-02-08 RX ORDER — CEFAZOLIN SODIUM 1 G
3000 VIAL (EA) INJECTION
Refills: 0 | Status: COMPLETED | OUTPATIENT
Start: 2021-02-08 | End: 2021-02-08

## 2021-02-08 RX ORDER — MAGNESIUM HYDROXIDE 400 MG/1
30 TABLET, CHEWABLE ORAL DAILY
Refills: 0 | Status: DISCONTINUED | OUTPATIENT
Start: 2021-02-08 | End: 2021-02-09

## 2021-02-08 RX ORDER — SODIUM CHLORIDE 9 MG/ML
1000 INJECTION, SOLUTION INTRAVENOUS
Refills: 0 | Status: DISCONTINUED | OUTPATIENT
Start: 2021-02-08 | End: 2021-02-08

## 2021-02-08 RX ORDER — ACETAMINOPHEN 500 MG
1000 TABLET ORAL
Refills: 0 | Status: COMPLETED | OUTPATIENT
Start: 2021-02-08 | End: 2021-02-08

## 2021-02-08 RX ORDER — SODIUM CHLORIDE 9 MG/ML
500 INJECTION INTRAMUSCULAR; INTRAVENOUS; SUBCUTANEOUS ONCE
Refills: 0 | Status: COMPLETED | OUTPATIENT
Start: 2021-02-08 | End: 2021-02-08

## 2021-02-08 RX ORDER — SODIUM CHLORIDE 9 MG/ML
1000 INJECTION INTRAMUSCULAR; INTRAVENOUS; SUBCUTANEOUS
Refills: 0 | Status: DISCONTINUED | OUTPATIENT
Start: 2021-02-08 | End: 2021-02-09

## 2021-02-08 RX ORDER — KETOROLAC TROMETHAMINE 30 MG/ML
15 SYRINGE (ML) INJECTION EVERY 6 HOURS
Refills: 0 | Status: DISCONTINUED | OUTPATIENT
Start: 2021-02-08 | End: 2021-02-09

## 2021-02-08 RX ORDER — ONDANSETRON 8 MG/1
4 TABLET, FILM COATED ORAL EVERY 6 HOURS
Refills: 0 | Status: DISCONTINUED | OUTPATIENT
Start: 2021-02-08 | End: 2021-02-09

## 2021-02-08 RX ORDER — APREPITANT 80 MG/1
40 CAPSULE ORAL ONCE
Refills: 0 | Status: COMPLETED | OUTPATIENT
Start: 2021-02-08 | End: 2021-02-08

## 2021-02-08 RX ORDER — ONDANSETRON 8 MG/1
4 TABLET, FILM COATED ORAL ONCE
Refills: 0 | Status: DISCONTINUED | OUTPATIENT
Start: 2021-02-08 | End: 2021-02-08

## 2021-02-08 RX ORDER — OXYCODONE HYDROCHLORIDE 5 MG/1
5 TABLET ORAL
Refills: 0 | Status: DISCONTINUED | OUTPATIENT
Start: 2021-02-08 | End: 2021-02-09

## 2021-02-08 RX ORDER — CEPHALEXIN 500 MG
500 CAPSULE ORAL
Refills: 0 | Status: DISCONTINUED | OUTPATIENT
Start: 2021-02-09 | End: 2021-02-09

## 2021-02-08 RX ORDER — PANTOPRAZOLE SODIUM 20 MG/1
40 TABLET, DELAYED RELEASE ORAL
Refills: 0 | Status: DISCONTINUED | OUTPATIENT
Start: 2021-02-08 | End: 2021-02-09

## 2021-02-08 RX ORDER — ACETAMINOPHEN 500 MG
975 TABLET ORAL EVERY 8 HOURS
Refills: 0 | Status: DISCONTINUED | OUTPATIENT
Start: 2021-02-08 | End: 2021-02-09

## 2021-02-08 RX ORDER — CEFAZOLIN SODIUM 1 G
3000 VIAL (EA) INJECTION ONCE
Refills: 0 | Status: DISCONTINUED | OUTPATIENT
Start: 2021-02-08 | End: 2021-02-08

## 2021-02-08 RX ORDER — HYDROMORPHONE HYDROCHLORIDE 2 MG/ML
0.5 INJECTION INTRAMUSCULAR; INTRAVENOUS; SUBCUTANEOUS
Refills: 0 | Status: DISCONTINUED | OUTPATIENT
Start: 2021-02-08 | End: 2021-02-09

## 2021-02-08 RX ORDER — TRANEXAMIC ACID 100 MG/ML
1000 INJECTION, SOLUTION INTRAVENOUS ONCE
Refills: 0 | Status: DISCONTINUED | OUTPATIENT
Start: 2021-02-08 | End: 2021-02-08

## 2021-02-08 RX ORDER — CELECOXIB 200 MG/1
200 CAPSULE ORAL EVERY 12 HOURS
Refills: 0 | Status: DISCONTINUED | OUTPATIENT
Start: 2021-02-09 | End: 2021-02-09

## 2021-02-08 RX ORDER — NICOTINE POLACRILEX 2 MG
1 GUM BUCCAL DAILY
Refills: 0 | Status: DISCONTINUED | OUTPATIENT
Start: 2021-02-08 | End: 2021-02-09

## 2021-02-08 RX ORDER — SENNA PLUS 8.6 MG/1
2 TABLET ORAL AT BEDTIME
Refills: 0 | Status: DISCONTINUED | OUTPATIENT
Start: 2021-02-08 | End: 2021-02-09

## 2021-02-08 RX ORDER — HYDROMORPHONE HYDROCHLORIDE 2 MG/ML
4 INJECTION INTRAMUSCULAR; INTRAVENOUS; SUBCUTANEOUS
Refills: 0 | Status: DISCONTINUED | OUTPATIENT
Start: 2021-02-08 | End: 2021-02-09

## 2021-02-08 RX ORDER — FENTANYL CITRATE 50 UG/ML
50 INJECTION INTRAVENOUS
Refills: 0 | Status: DISCONTINUED | OUTPATIENT
Start: 2021-02-08 | End: 2021-02-08

## 2021-02-08 RX ORDER — ACETAMINOPHEN 500 MG
975 TABLET ORAL ONCE
Refills: 0 | Status: COMPLETED | OUTPATIENT
Start: 2021-02-08 | End: 2021-02-08

## 2021-02-08 RX ORDER — ASPIRIN/CALCIUM CARB/MAGNESIUM 324 MG
325 TABLET ORAL
Refills: 0 | Status: DISCONTINUED | OUTPATIENT
Start: 2021-02-09 | End: 2021-02-09

## 2021-02-08 RX ADMIN — Medication 15 MILLIGRAM(S): at 17:35

## 2021-02-08 RX ADMIN — SENNA PLUS 2 TABLET(S): 8.6 TABLET ORAL at 20:56

## 2021-02-08 RX ADMIN — Medication 15 MILLIGRAM(S): at 12:22

## 2021-02-08 RX ADMIN — SODIUM CHLORIDE 500 MILLILITER(S): 9 INJECTION INTRAMUSCULAR; INTRAVENOUS; SUBCUTANEOUS at 11:47

## 2021-02-08 RX ADMIN — Medication 400 MILLIGRAM(S): at 14:20

## 2021-02-08 RX ADMIN — FENTANYL CITRATE 50 MICROGRAM(S): 50 INJECTION INTRAVENOUS at 12:33

## 2021-02-08 RX ADMIN — Medication 200 MILLIGRAM(S): at 22:43

## 2021-02-08 RX ADMIN — FENTANYL CITRATE 50 MICROGRAM(S): 50 INJECTION INTRAVENOUS at 12:13

## 2021-02-08 RX ADMIN — FENTANYL CITRATE 50 MICROGRAM(S): 50 INJECTION INTRAVENOUS at 11:46

## 2021-02-08 RX ADMIN — FENTANYL CITRATE 50 MICROGRAM(S): 50 INJECTION INTRAVENOUS at 11:52

## 2021-02-08 RX ADMIN — Medication 975 MILLIGRAM(S): at 20:56

## 2021-02-08 RX ADMIN — APREPITANT 40 MILLIGRAM(S): 80 CAPSULE ORAL at 06:40

## 2021-02-08 RX ADMIN — SODIUM CHLORIDE 75 MILLILITER(S): 9 INJECTION, SOLUTION INTRAVENOUS at 11:47

## 2021-02-08 RX ADMIN — SODIUM CHLORIDE 150 MILLILITER(S): 9 INJECTION INTRAMUSCULAR; INTRAVENOUS; SUBCUTANEOUS at 22:43

## 2021-02-08 RX ADMIN — SODIUM CHLORIDE 3 MILLILITER(S): 9 INJECTION INTRAMUSCULAR; INTRAVENOUS; SUBCUTANEOUS at 20:55

## 2021-02-08 RX ADMIN — Medication 200 MILLIGRAM(S): at 16:34

## 2021-02-08 RX ADMIN — CELECOXIB 400 MILLIGRAM(S): 200 CAPSULE ORAL at 06:40

## 2021-02-08 RX ADMIN — Medication 15 MILLIGRAM(S): at 22:43

## 2021-02-08 RX ADMIN — Medication 975 MILLIGRAM(S): at 06:40

## 2021-02-08 NOTE — DISCHARGE NOTE PROVIDER - CARE PROVIDER_API CALL
Leonardo Farrell)  Orthopaedic Surgery  [address]  [place] [address]  Phone: [phone]  Fax: [phone]  Follow Up Time:

## 2021-02-08 NOTE — DISCHARGE NOTE PROVIDER - NSDCFUADDINST_GEN_ALL_CORE_FT
The patient will be seen in the office between 2-3 weeks for wound check.   **Your first post-operative visit has been scheduled prior to your admission. PLEASE CONTACT OFFICE TO CONFIRM THE APPOINTMENT DATE. Sutures/Staples/Tape will be removed at that time.  **  The silver based dressing is to be removed 7 days from the date of surgery (2/15/21).   ** CONTACT THE OFFICE IF THE FOLLOWING DEVELOP:  - the dressing becomes soiled or saturated  - you develop a fever greater that 101F  - the wound becomes red or you develop blistering around the wound  * Patient may shower after post-op day #3.   * The patient will continue home PT consistent with  total knee replacement protocol. Transition to outpatient PT will occur at the time of the first office visit.   * The patient will practice knee extension exercises regularly to minimize hamstring contraction.   * The patient is FULL weight bearing.  * The patient will continue ASPIRIN for 6 weeks after surgery for blood clot prevention.  *** While on aspirin, the patient will take daily omeprazole or other similar medication to protect the stomach from irritation.   * The patient will take OXYCODONE AND TYLENOL for pain control and adjust according to prescription and patient needs. Contact the office if pain increases while taking prescribed pain medications or related concerns develop.  * Celebrex will be taken twice daily for 3 weeks for pain control and prevention of excessive bone growth. Additional prescription may be requested at your office follow-up visit.   * The patient will take Senna S while taking oxycodone to prevent narcotic associated constipation.  Additionally, increase water intake (drink at least 8 glasses of water daily) and try adding fiber to the diet by eating fruits, vegetables and foods that are rich in grains. If constipation is experienced, contact the medical/primary care provider to discuss further treatment options.  * To avoid injury at home:  - continue use of rolling walker until cleared by physical therapist  - have family or friend remove all throw rug or objects in hallways that may present a trip hazard.  - if you experience any dizziness or medical concerns, call your medical doctor or  911.  * The implant may activate metal detection devices.   * In addition, the patient will take Duricef (cefadroxil) 500mg twice daily x 7 days post op.  The patient will be seen in the office between 2-3 weeks for wound check.   **Your first post-operative visit has been scheduled prior to your admission. PLEASE CONTACT OFFICE TO CONFIRM THE APPOINTMENT DATE.   **  The silver based dressing is to be removed 7 days from the date of surgery (2/15/21).   ** CONTACT THE OFFICE IF THE FOLLOWING DEVELOP:  - the dressing becomes soiled or saturated  - you develop a fever greater that 101F  - the wound becomes red or you develop blistering around the wound  * Patient may shower after post-op day #3.   * The patient will continue home PT consistent with  total knee replacement protocol. Transition to outpatient PT will occur at the time of the first office visit.   * The patient will practice knee extension exercises regularly to minimize hamstring contraction.   * The patient is FULL weight bearing.  * The patient will continue ASPIRIN for 6 weeks after surgery for blood clot prevention.  *** While on aspirin, the patient will take daily omeprazole or other similar medication to protect the stomach from irritation.   * The patient will take OXYCODONE AND TYLENOL for pain control and adjust according to prescription and patient needs. Contact the office if pain increases while taking prescribed pain medications or related concerns develop.  * Celebrex will be taken twice daily for 3 weeks for pain control and prevention of excessive bone growth. Additional prescription may be requested at your office follow-up visit.   * The patient will take Senna S while taking oxycodone to prevent narcotic associated constipation.  Additionally, increase water intake (drink at least 8 glasses of water daily) and try adding fiber to the diet by eating fruits, vegetables and foods that are rich in grains. If constipation is experienced, contact the medical/primary care provider to discuss further treatment options.  * To avoid injury at home:  - continue use of rolling walker until cleared by physical therapist  - have family or friend remove all throw rug or objects in hallways that may present a trip hazard.  - if you experience any dizziness or medical concerns, call your medical doctor or  911.  * The implant may activate metal detection devices.   * In addition, the patient will take Duricef (cefadroxil) 500mg twice daily x 7 days post op.

## 2021-02-08 NOTE — PHYSICAL THERAPY INITIAL EVALUATION ADULT - GAIT DISTANCE, PT EVAL
pt demonstrates increased respiratory rate and reports SOB, pt able to ambulate back to room. SpO2 : 93% post PT session./75 feet

## 2021-02-08 NOTE — CONSULT NOTE ADULT - ASSESSMENT
50  year old with PMH DM diet controlled , STEFAN - CPAP , GERD , obesity , smoker , L knee chronic pain  for 2 yrs , was taking Meloxicam / Tylenol . Pain was getting worst , now s/p L TKA on  2/8/21 .

## 2021-02-08 NOTE — CONSULT NOTE ADULT - PROBLEM SELECTOR RECOMMENDATION 7
DVT prophylaxis  - as per ortho protocol  Opioid induced constipation  prophylaxis - bowel regimen   Postoperative infection  prophylaxis in high risk patient.

## 2021-02-08 NOTE — DISCHARGE NOTE PROVIDER - NSDCMRMEDTOKEN_GEN_ALL_CORE_FT
Naprosyn 500 mg oral tablet: 1 tab(s) orally 2 times a day  omeprazole:  orally   SMZ-TMP  mg-160 mg oral tablet: 1 tab(s) orally 2 times a day  Tylenol: 1000 milligram(s) orally once a day, As Needed   acetaminophen 325 mg oral tablet: 3 tab(s) orally every 8 hours  aspirin 325 mg oral tablet: 1 tab(s) orally 2 times a day  cefadroxil 500 mg oral capsule: 1 cap(s) orally every 12 hours   celecoxib 200 mg oral capsule: 1 cap(s) orally every 12 hours  omeprazole 20 mg oral delayed release capsule: 1 cap(s) orally once a day   oxyCODONE 5 mg oral tablet: 1-2  tab(s) orally every 4 to 6 hours, As Needed -Mild to Moderate Pain (1 - 3) MDD:6  Senna S 50 mg-8.6 mg oral tablet: 2 tab(s) orally once a day (at bedtime)

## 2021-02-08 NOTE — CONSULT NOTE ADULT - SUBJECTIVE AND OBJECTIVE BOX
Patient is a 50y old  Male who is s/p L TKA , POD # 0 , seen and examined on PACU , tolerated procedure well .    CC: chronic L knee pain       HPI:  50  year old with PMH DM diet controlled , STEFAN - CPAP , GERD , obesity , smoker , L knee chronic pain  for 2 yrs , was taking Meloxicam / Tylenol . Pain was getting worst , now s/p L TKA on  2/8/21 .       PAST MEDICAL & SURGICAL HISTORY:    Arthritis  knees    Diabetes  no meds    Lower back pain    Sleep apnea  CPAP  DR. Aponte    GERD with apnea    H/O arthroscopy  left knee    H/O bariatric surgery  gastric sleeve  2017  lost 55 lb    H/O hemorrhoidectomy    S/P appendectomy        Social History:  Tobacco - 1/2 PPD X 35 yrs   ETOH - 2-3 drinks few time wkly   Illicit drug abuse - use to use cocaine  in the past , quit few yrs ago      FAMILY HISTORY:  FH: diabetes mellitus  mother        Allergies    No Known Allergies    Intolerances        HOME MEDICATIONS :   · 	Naprosyn 500 mg oral tablet: Last Dose Taken:  , 1 tab(s) orally 2 times a day  · 	omeprazole: Last Dose Taken:  ,  orally   · 	Tylenol: Last Dose Taken:  , 1000 milligram(s) orally once a day, As Needed    REVIEW OF SYSTEMS:    Chronic L knee pain     MEDICATIONS  (STANDING):  acetaminophen   Tablet .. 975 milliGRAM(s) Oral every 8 hours  ceFAZolin   IVPB 3000 milliGRAM(s) IV Intermittent <User Schedule>  ketorolac   Injectable 15 milliGRAM(s) IV Push every 6 hours  nicotine -  14 mG/24Hr(s) Patch 1 patch Transdermal daily  pantoprazole    Tablet 40 milliGRAM(s) Oral before breakfast  senna 2 Tablet(s) Oral at bedtime  sodium chloride 0.9% lock flush 3 milliLiter(s) IV Push every 8 hours  sodium chloride 0.9%. 1000 milliLiter(s) (150 mL/Hr) IV Continuous <Continuous>    MEDICATIONS  (PRN):  HYDROmorphone   Tablet 4 milliGRAM(s) Oral every 3 hours PRN Severe Pain (7 - 10)  HYDROmorphone  Injectable 0.5 milliGRAM(s) IV Push every 3 hours PRN breakthru  magnesium hydroxide Suspension 30 milliLiter(s) Oral daily PRN Constipation  ondansetron Injectable 4 milliGRAM(s) IV Push every 6 hours PRN Nausea and/or Vomiting  oxyCODONE    IR 5 milliGRAM(s) Oral every 3 hours PRN Mild Pain (1 - 3)  oxyCODONE    IR 10 milliGRAM(s) Oral every 3 hours PRN Moderate Pain (4 - 6)      Vital Signs Last 24 Hrs  T(C): 36.8 (08 Feb 2021 16:06), Max: 36.8 (08 Feb 2021 16:06)  T(F): 98.2 (08 Feb 2021 16:06), Max: 98.2 (08 Feb 2021 16:06)  HR: 91 (08 Feb 2021 16:06) (62 - 99)  BP: 106/62 (08 Feb 2021 16:06) (106/46 - 141/76)  BP(mean): --  RR: 18 (08 Feb 2021 16:06) (11 - 22)  SpO2: 93% (08 Feb 2021 16:06) (93% - 100%)    PHYSICAL EXAM:    GENERAL: NAD, well-groomed, obese   HEAD:  Atraumatic, Normocephalic  EYES: EOMI, PERRLA, conjunctiva and sclera clear  NECK: Supple, No JVD, Normal thyroid  NERVOUS SYSTEM:  Alert & Oriented X3, no focal deficit   CHEST/LUNG: CTA  b/l,  no rales, rhonchi, wheezing, or rubs  HEART: Regular rate and rhythm; No murmurs, rubs, or gallops  ABDOMEN: Soft, Nontender, Nondistended; Bowel sounds present  EXTREMITIES:  2+ Peripheral Pulses, No clubbing, cyanosis, or edema , L knee ACE wrap + , clean and dry   LYMPH: No lymphadenopathy noted  SKIN: No rashes or lesions    LABS: Pending       RADIOLOGY & ADDITIONAL STUDIES:  < from: Xray Knee 1 or 2 Views, Left (02.08.21 @ 11:37) >     EXAM:  KNEE-LEFT                          PROCEDURE DATE:  02/08/2021          INTERPRETATION:  History: Status post knee replacement    Technique: 2 views of the left knee    Findings:    Patient status post total left knee replacement in good anatomic alignment. Postsurgical changes.    Impression:    Status post left total knee replacement in good anatomic alignment              BRANDON JADE MD; Attending Radiologist  This document has been electronically signed. Feb 8 2021  1:41PM    < end of copied text >

## 2021-02-08 NOTE — CHART NOTE - NSCHARTNOTEFT_GEN_A_CORE
Pt. required assistance with the set-up of own CPAP machine.  Device was checked by [employer] during the dayshift and it was cleared to use.  Device is now set-up at bedside for pt. use.  NARD noted at this time.  Vitals: SpO2 97%, HR 90 and RR 20 on room air.

## 2021-02-08 NOTE — CONSULT NOTE ADULT - PROBLEM SELECTOR RECOMMENDATION 9
s/p L TKA ,   PT/OT/pain mgmt  DVT prophylaxis- as per ortho  Abx as per SCIP  Incentive spirometry  Prophylaxis of opioid  induced constipation.

## 2021-02-08 NOTE — PHYSICAL THERAPY INITIAL EVALUATION ADULT - ADDITIONAL COMMENTS
pt reports he lives in a private house ( 2 steps to enter with no handrail ) with his wife. pt reports wife took off work for a week to assist as needed. pt reports independence with ADLs with difficulty donning/doffing socks and shoes due to knee pain, driving and working prior to admission. pt owns RW, cane, commode and raised toilet seat.

## 2021-02-08 NOTE — DISCHARGE NOTE PROVIDER - NSDCFUSCHEDAPPT_GEN_ALL_CORE_FT
NICK CUI ; 03/04/2021 ; [facility] [address] ; 03/25/2021 ; [facility] [address]

## 2021-02-08 NOTE — PHYSICAL THERAPY INITIAL EVALUATION ADULT - GENERAL OBSERVATIONS, REHAB EVAL
pt received OOB in bathroom with nursing assistant + IV lock, NAD and willing to participate with PT.

## 2021-02-08 NOTE — DISCHARGE NOTE PROVIDER - HOSPITAL COURSE
The patient underwent a LEFT TOTAL KNEE REPLACEMENT on 2/8/21. The patient received antibiotics consistent with SCIP guidelines. The patient underwent the procedure and had no intra-operative complications. Post-operatively, the patient was seen by medicine and PT. The patient received ASA  for clot prevention. The patient received pain medications per orthopedic pain managment pathway and the pain was appropriately controlled. The patient did not have any post-operative medical complications. The patient was discharged in stable condition.

## 2021-02-09 ENCOUNTER — TRANSCRIPTION ENCOUNTER (OUTPATIENT)
Age: 51
End: 2021-02-09

## 2021-02-09 VITALS
HEART RATE: 83 BPM | DIASTOLIC BLOOD PRESSURE: 71 MMHG | SYSTOLIC BLOOD PRESSURE: 143 MMHG | OXYGEN SATURATION: 94 % | RESPIRATION RATE: 18 BRPM | TEMPERATURE: 98 F

## 2021-02-09 LAB
ANION GAP SERPL CALC-SCNC: 11 MMOL/L — SIGNIFICANT CHANGE UP (ref 5–17)
BUN SERPL-MCNC: 12 MG/DL — SIGNIFICANT CHANGE UP (ref 8–20)
CALCIUM SERPL-MCNC: 8.5 MG/DL — LOW (ref 8.6–10.2)
CHLORIDE SERPL-SCNC: 105 MMOL/L — SIGNIFICANT CHANGE UP (ref 98–107)
CO2 SERPL-SCNC: 21 MMOL/L — LOW (ref 22–29)
CREAT SERPL-MCNC: 0.71 MG/DL — SIGNIFICANT CHANGE UP (ref 0.5–1.3)
GLUCOSE SERPL-MCNC: 165 MG/DL — HIGH (ref 70–99)
HCT VFR BLD CALC: 34.7 % — LOW (ref 39–50)
HGB BLD-MCNC: 11.5 G/DL — LOW (ref 13–17)
MCHC RBC-ENTMCNC: 28.2 PG — SIGNIFICANT CHANGE UP (ref 27–34)
MCHC RBC-ENTMCNC: 33.1 GM/DL — SIGNIFICANT CHANGE UP (ref 32–36)
MCV RBC AUTO: 85 FL — SIGNIFICANT CHANGE UP (ref 80–100)
PLATELET # BLD AUTO: 138 K/UL — LOW (ref 150–400)
POTASSIUM SERPL-MCNC: 4.1 MMOL/L — SIGNIFICANT CHANGE UP (ref 3.5–5.3)
POTASSIUM SERPL-SCNC: 4.1 MMOL/L — SIGNIFICANT CHANGE UP (ref 3.5–5.3)
RBC # BLD: 4.08 M/UL — LOW (ref 4.2–5.8)
RBC # FLD: 13.1 % — SIGNIFICANT CHANGE UP (ref 10.3–14.5)
SODIUM SERPL-SCNC: 137 MMOL/L — SIGNIFICANT CHANGE UP (ref 135–145)
WBC # BLD: 12.7 K/UL — HIGH (ref 3.8–10.5)
WBC # FLD AUTO: 12.7 K/UL — HIGH (ref 3.8–10.5)

## 2021-02-09 PROCEDURE — 97116 GAIT TRAINING THERAPY: CPT

## 2021-02-09 PROCEDURE — 86850 RBC ANTIBODY SCREEN: CPT

## 2021-02-09 PROCEDURE — 97163 PT EVAL HIGH COMPLEX 45 MIN: CPT

## 2021-02-09 PROCEDURE — 80048 BASIC METABOLIC PNL TOTAL CA: CPT

## 2021-02-09 PROCEDURE — S2900: CPT

## 2021-02-09 PROCEDURE — C1776: CPT

## 2021-02-09 PROCEDURE — 99232 SBSQ HOSP IP/OBS MODERATE 35: CPT

## 2021-02-09 PROCEDURE — 73560 X-RAY EXAM OF KNEE 1 OR 2: CPT

## 2021-02-09 PROCEDURE — C1713: CPT

## 2021-02-09 PROCEDURE — 82962 GLUCOSE BLOOD TEST: CPT

## 2021-02-09 PROCEDURE — 36415 COLL VENOUS BLD VENIPUNCTURE: CPT

## 2021-02-09 PROCEDURE — 97167 OT EVAL HIGH COMPLEX 60 MIN: CPT

## 2021-02-09 PROCEDURE — 86901 BLOOD TYPING SEROLOGIC RH(D): CPT

## 2021-02-09 PROCEDURE — 85027 COMPLETE CBC AUTOMATED: CPT

## 2021-02-09 PROCEDURE — 86900 BLOOD TYPING SEROLOGIC ABO: CPT

## 2021-02-09 PROCEDURE — 97110 THERAPEUTIC EXERCISES: CPT

## 2021-02-09 RX ORDER — OXYCODONE HYDROCHLORIDE 5 MG/1
1 TABLET ORAL
Qty: 42 | Refills: 0
Start: 2021-02-09 | End: 2021-02-15

## 2021-02-09 RX ORDER — ACETAMINOPHEN 500 MG
1000 TABLET ORAL
Qty: 0 | Refills: 0 | DISCHARGE

## 2021-02-09 RX ORDER — ACETAMINOPHEN 500 MG
3 TABLET ORAL
Qty: 0 | Refills: 0 | DISCHARGE
Start: 2021-02-09

## 2021-02-09 RX ORDER — ASPIRIN/CALCIUM CARB/MAGNESIUM 324 MG
1 TABLET ORAL
Qty: 82 | Refills: 0
Start: 2021-02-09 | End: 2021-03-21

## 2021-02-09 RX ORDER — OMEPRAZOLE 10 MG/1
1 CAPSULE, DELAYED RELEASE ORAL
Qty: 41 | Refills: 0
Start: 2021-02-09 | End: 2021-03-21

## 2021-02-09 RX ORDER — PERFLUTREN 6.52 MG/ML
6.52 INJECTION, SUSPENSION INTRAVENOUS
Qty: 2 | Refills: 0 | Status: COMPLETED | OUTPATIENT
Start: 2021-01-27

## 2021-02-09 RX ORDER — REGADENOSON 0.08 MG/ML
0.4 INJECTION, SOLUTION INTRAVENOUS
Qty: 4 | Refills: 0 | Status: COMPLETED | OUTPATIENT
Start: 2021-01-30

## 2021-02-09 RX ORDER — SENNOSIDES/DOCUSATE SODIUM 8.6MG-50MG
2 TABLET ORAL
Qty: 28 | Refills: 0
Start: 2021-02-09 | End: 2021-02-22

## 2021-02-09 RX ADMIN — SODIUM CHLORIDE 3 MILLILITER(S): 9 INJECTION INTRAMUSCULAR; INTRAVENOUS; SUBCUTANEOUS at 04:26

## 2021-02-09 RX ADMIN — Medication 325 MILLIGRAM(S): at 04:24

## 2021-02-09 RX ADMIN — Medication 15 MILLIGRAM(S): at 04:24

## 2021-02-09 RX ADMIN — Medication 1 PATCH: at 11:31

## 2021-02-09 RX ADMIN — CELECOXIB 200 MILLIGRAM(S): 200 CAPSULE ORAL at 04:24

## 2021-02-09 RX ADMIN — OXYCODONE HYDROCHLORIDE 10 MILLIGRAM(S): 5 TABLET ORAL at 11:31

## 2021-02-09 RX ADMIN — Medication 500 MILLIGRAM(S): at 04:26

## 2021-02-09 RX ADMIN — Medication 500 MILLIGRAM(S): at 11:31

## 2021-02-09 RX ADMIN — Medication 975 MILLIGRAM(S): at 04:24

## 2021-02-09 RX ADMIN — PANTOPRAZOLE SODIUM 40 MILLIGRAM(S): 20 TABLET, DELAYED RELEASE ORAL at 04:24

## 2021-02-09 NOTE — PROGRESS NOTE ADULT - ASSESSMENT
50  year old with PMH DM diet controlled , STEFAN - CPAP , GERD , obesity , smoker , L knee chronic pain  for 2 yrs , was taking Meloxicam / Tylenol . Pain was getting worst , now s/p L TKA on  2/8/21 .      Problem/Recommendation - 1:  Problem: Primary osteoarthritis of left knee. Recommendation: s/p L TKA ,   PT/OT/pain mgmt  DVT prophylaxis- as per ortho  Abx as per SCIP- given   Incentive spirometry  Prophylaxis of opioid  induced constipation.     Problem/Recommendation - 2:  ·  Problem: Diabetes type 2   Recommendation: A1C with Estimated Average Glucose Result: 5.7 % (01.19.21 @ 10:22)  - diet controlled.      Problem/Recommendation - 3:  ·  Problem: Sleep apnea.  Recommendation: - uses home CPAP - may continue to use.      Problem/Recommendation - 4:  ·  Problem: GERD with apnea.  Recommendation: - continue PPI.      Problem/Recommendation - 5:  ·  Problem: Morbid obesity with BMI of 45.0-49.9, adult.  Recommendation: - will benefit from weight lost , bariatric eval recommended ,   may be done as on outpatient.      Problem/Recommendation - 6:  Problem: Smoker. Recommendation: - smoking cessation counseling provided , . 3 min spent ,   agree with Nicotine patch.     Problem/Recommendation - 7:  Problem: Need for prophylactic measure. Recommendation: DVT prophylaxis  - as per ortho protocol- ASA  Opioid induced constipation  prophylaxis - bowel regimen   Postoperative infection  prophylaxis in high risk patient ( DM , morbid obesity ) - as per ortho PA .   d/w patient / nurse / ortho PA / CM .   Dispo plan is Home - likely today .    Medically stable to d/c once cleared by physical therapy / ortho .

## 2021-02-09 NOTE — PROGRESS NOTE ADULT - SUBJECTIVE AND OBJECTIVE BOX
NICK CUI  [id number]    History: 50y Male is status post left total knee arthroplasty, POD # 1. Patient is doing well and is comfortable. The patient's pain is controlled using the prescribed pain medications. The patient is participating in physical therapy. Denies nausea, vomiting, chest pain, shortness of breath, abdominal pain or fever. No new complaints.    Vital Signs Last 24 Hrs  T(C): 36.6 (09 Feb 2021 04:19), Max: 37 (08 Feb 2021 22:41)  T(F): 97.8 (09 Feb 2021 04:19), Max: 98.6 (08 Feb 2021 22:41)  HR: 70 (09 Feb 2021 04:19) (70 - 99)  BP: 108/56 (09 Feb 2021 04:19) (106/46 - 131/60)  BP(mean): --  RR: 17 (09 Feb 2021 04:19) (11 - 22)  SpO2: 95% (09 Feb 2021 04:19) (93% - 100%)                        11.5   12.70 )-----------( 138      ( 09 Feb 2021 06:54 )             34.7     02-09    137  |  105  |  12.0  ----------------------------<  165<H>  4.1   |  21.0<L>  |  0.71    Ca    8.5<L>      09 Feb 2021 06:54    MEDICATIONS  (STANDING):  acetaminophen   Tablet .. 975 milliGRAM(s) Oral every 8 hours  aspirin 325 milliGRAM(s) Oral two times a day  celecoxib 200 milliGRAM(s) Oral every 12 hours  cephalexin 500 milliGRAM(s) Oral four times a day  nicotine -  14 mG/24Hr(s) Patch 1 patch Transdermal daily  pantoprazole    Tablet 40 milliGRAM(s) Oral before breakfast  senna 2 Tablet(s) Oral at bedtime  sodium chloride 0.9% lock flush 3 milliLiter(s) IV Push every 8 hours  sodium chloride 0.9%. 1000 milliLiter(s) (150 mL/Hr) IV Continuous <Continuous>    MEDICATIONS  (PRN):  HYDROmorphone   Tablet 4 milliGRAM(s) Oral every 3 hours PRN Severe Pain (7 - 10)  HYDROmorphone  Injectable 0.5 milliGRAM(s) IV Push every 3 hours PRN breakthru  magnesium hydroxide Suspension 30 milliLiter(s) Oral daily PRN Constipation  ondansetron Injectable 4 milliGRAM(s) IV Push every 6 hours PRN Nausea and/or Vomiting  oxyCODONE    IR 5 milliGRAM(s) Oral every 3 hours PRN Mild Pain (1 - 3)  oxyCODONE    IR 10 milliGRAM(s) Oral every 3 hours PRN Moderate Pain (4 - 6)    Physical exam: The left knee dressing is clean, dry and intact. No drainage or discharge. No erythema is noted. No blistering. No ecchymosis. The calf is supple nontender. Passive range of motion is acceptable to due postoperative pain. Sensation to light touch is grossly intact distally. Motor function distally is 5/5. Extensor hallucis longus and flexor hallucis longus are intact. No foot drop. 2+ dorsalis pedis pulse. Capillary refill is less than 2 seconds. No cyanosis.    Primary Orthopedic Assessment:  • s/p LEFT total knee replacement	    Secondary  Medical Assessment(s):   • DM, GERD, STEFAN, MO	    Plan:   •	DVT prophylaxis as prescribed, including use of compression devices and ankle pumps  •	Continue physical therapy  •	Weightbearing as tolerated of the left lower extremity with assistance of a walker  •	Incentive spirometry encouraged  •	Pain control as clinically indicated  •	Discharge planning – anticipated discharge is Home today when cleared by PT and Medicine  
Orthopedic PA Postop Note  Patient S/P LEFT TKA  Patient in bed comfortable   LEFT Leg  Dressing C/D/I - ACE wrap without staining  DP Pulse intact   Calf Soft NT  Dorsi/Plantar Flexion/EHL/FHL intact   Sensation intact to light touch    Vital Signs Last 24 Hrs  T(C): 36.8 (08 Feb 2021 16:06), Max: 36.8 (08 Feb 2021 16:06)  T(F): 98.2 (08 Feb 2021 16:06), Max: 98.2 (08 Feb 2021 16:06)  HR: 91 (08 Feb 2021 16:06) (62 - 99)  BP: 106/62 (08 Feb 2021 16:06) (106/46 - 141/76)  BP(mean): --  RR: 18 (08 Feb 2021 16:06) (11 - 22)  SpO2: 93% (08 Feb 2021 16:06) (93% - 100%)    < from: Xray Knee 1 or 2 Views, Left (02.08.21 @ 11:37) >     EXAM:  KNEE-LEFT                          PROCEDURE DATE:  02/08/2021          INTERPRETATION:  History: Status post knee replacement    Technique: 2 views of the left knee    Findings:    Patient status post total left knee replacement in good anatomic alignment. Postsurgical changes.    Impression:    Status post left total knee replacement in good anatomic alignment              BRANDON JADE MD; Attending Radiologist  This document has been electronically signed. Feb 8 2021  1:41PM    < end of copied text >        A/P:  S/P LEFT TKA  1. DVTP - ASA  2. Physical Therapy   3. Pain Control as clinically indicated 
Patient seen and examined . S/p L TKA  , POD # 1. Pain well controlled , no n/v , voiding without difficulty ,   participating withy physical therapy .     CC : L knee chronic pain , well controlled postop       MEDICATIONS  (STANDING):  acetaminophen   Tablet .. 975 milliGRAM(s) Oral every 8 hours  aspirin 325 milliGRAM(s) Oral two times a day  celecoxib 200 milliGRAM(s) Oral every 12 hours  cephalexin 500 milliGRAM(s) Oral four times a day  nicotine -  14 mG/24Hr(s) Patch 1 patch Transdermal daily  pantoprazole    Tablet 40 milliGRAM(s) Oral before breakfast  senna 2 Tablet(s) Oral at bedtime  sodium chloride 0.9% lock flush 3 milliLiter(s) IV Push every 8 hours  sodium chloride 0.9%. 1000 milliLiter(s) (150 mL/Hr) IV Continuous <Continuous>    MEDICATIONS  (PRN):  HYDROmorphone   Tablet 4 milliGRAM(s) Oral every 3 hours PRN Severe Pain (7 - 10)  HYDROmorphone  Injectable 0.5 milliGRAM(s) IV Push every 3 hours PRN breakthru  magnesium hydroxide Suspension 30 milliLiter(s) Oral daily PRN Constipation  ondansetron Injectable 4 milliGRAM(s) IV Push every 6 hours PRN Nausea and/or Vomiting  oxyCODONE    IR 5 milliGRAM(s) Oral every 3 hours PRN Mild Pain (1 - 3)  oxyCODONE    IR 10 milliGRAM(s) Oral every 3 hours PRN Moderate Pain (4 - 6)      LABS:                          11.5   12.70 )-----------( 138      ( 09 Feb 2021 06:54 )             34.7     02-09    137  |  105  |  12.0  ----------------------------<  165<H>  4.1   |  21.0<L>  |  0.71    Ca    8.5<L>      09 Feb 2021 06:54            RADIOLOGY & ADDITIONAL TESTS:  < from: Xray Knee 1 or 2 Views, Left (02.08.21 @ 11:37) >     EXAM:  KNEE-LEFT                          PROCEDURE DATE:  02/08/2021          INTERPRETATION:  History: Status post knee replacement    Technique: 2 views of the left knee    Findings:    Patient status post total left knee replacement in good anatomic alignment. Postsurgical changes.    Impression:    Status post left total knee replacement in good anatomic alignment    BRANDON JADE MD; Attending Radiologist  This document has been electronically signed. Feb 8 2021  1:41PM    < end of copied text >        REVIEW OF SYSTEMS:    L knee chronic pain well controlled with pain medications ,   all other systems are reviewed and are negative .    Vital Signs Last 24 Hrs  T(C): 36.6 (09 Feb 2021 04:19), Max: 37 (08 Feb 2021 22:41)  T(F): 97.8 (09 Feb 2021 04:19), Max: 98.6 (08 Feb 2021 22:41)  HR: 70 (09 Feb 2021 04:19) (70 - 99)  BP: 108/56 (09 Feb 2021 04:19) (106/46 - 131/60)  BP(mean): --  RR: 17 (09 Feb 2021 04:19) (11 - 22)  SpO2: 95% (09 Feb 2021 04:19) (93% - 100%)    PHYSICAL EXAM:    GENERAL: NAD, well-groomed, obese  HEAD:  Atraumatic, Normocephalic  EYES: EOMI, PERRLA, conjunctiva and sclera clear  NECK: Supple, No JVD, Normal thyroid  NERVOUS SYSTEM:  Alert & Oriented X3, no focal deficit  CHEST/LUNG: CTA b/l ,  no  rales, rhonchi, wheezing, or rubs  HEART: Regular rate and rhythm; No murmurs, rubs, or gallops  ABDOMEN: Soft, Nontender, Nondistended; Bowel sounds present  EXTREMITIES:  2+ Peripheral Pulses, No clubbing, cyanosis, or edema , L knee dressing + , clean and dry   LYMPH: No lymphadenopathy noted  SKIN: No rashes or lesions

## 2021-02-09 NOTE — OCCUPATIONAL THERAPY INITIAL EVALUATION ADULT - ADDITIONAL COMMENTS
Pt has tub with curtain  Pt owns a RW, cane, shower chair, and raised toilet seat  Pt is right handed

## 2021-02-09 NOTE — DISCHARGE NOTE NURSING/CASE MANAGEMENT/SOCIAL WORK - PATIENT PORTAL LINK FT
You can access the FollowMyHealth Patient Portal offered by [facility] by registering at the following website: [email or web address]. By joining [employer]’s FollowMyHealth portal, you will also be able to view your health information using other applications (apps) compatible with our system.

## 2021-02-10 RX ORDER — CELECOXIB 200 MG/1
1 CAPSULE ORAL
Qty: 42 | Refills: 0
Start: 2021-02-10 | End: 2021-03-02

## 2021-02-25 DIAGNOSIS — Z71.89 OTHER SPECIFIED COUNSELING: ICD-10-CM

## 2021-03-03 RX ORDER — KIT FOR THE PREPARATION OF TECHNETIUM TC99M SESTAMIBI 1 MG/5ML
INJECTION, POWDER, LYOPHILIZED, FOR SOLUTION PARENTERAL
Refills: 0 | Status: COMPLETED | OUTPATIENT
Start: 2021-03-03

## 2021-03-03 RX ADMIN — KIT FOR THE PREPARATION OF TECHNETIUM TC99M SESTAMIBI 0: 1 INJECTION, POWDER, LYOPHILIZED, FOR SOLUTION PARENTERAL at 00:00

## 2021-03-04 ENCOUNTER — APPOINTMENT (OUTPATIENT)
Dept: ORTHOPEDIC SURGERY | Facility: CLINIC | Age: 51
End: 2021-03-04
Payer: MEDICAID

## 2021-03-04 VITALS
HEIGHT: 65 IN | HEART RATE: 83 BPM | BODY MASS INDEX: 45.82 KG/M2 | SYSTOLIC BLOOD PRESSURE: 133 MMHG | DIASTOLIC BLOOD PRESSURE: 76 MMHG | WEIGHT: 275 LBS

## 2021-03-04 PROCEDURE — 99024 POSTOP FOLLOW-UP VISIT: CPT

## 2021-03-04 PROCEDURE — 73562 X-RAY EXAM OF KNEE 3: CPT | Mod: LT

## 2021-03-04 NOTE — HISTORY OF PRESENT ILLNESS
[___ Days Post Op] : post op day #[unfilled] [Doing Well] : is doing well [Excellent Pain Control] : has excellent pain control [No Sign of Infection] : is showing no signs of infection [Erythema] : not erythematous [Discharge] : absent of discharge [Dehiscence] : not dehisced [de-identified] : S/P left robotic-assisted TKR with EMERSON; DOS: 2/8/21. [de-identified] : NICK is a 50 year old male who presents today S/P left robotic-assisted TKR with EMERSON, 24 days ago.  He recently was evaluated for outpatient physical therapy and is due to start back next week.  He is currently taking Celebrex, and Tylenol for pain, he states he ran out of oxycodone which he would like refills.  He is currently taking aspirin for DVT prophylaxis.  Currently ambulating with a cane.  Overall is happy with his result.  He denies any incisional issues, fever or chills.\par  [de-identified] : Exam of the left knee shows a well healing incision. No instability, -5 to 111 degrees of flexion measured with a goniometer. 5/5 motor strength bilaterally distally. Sensation intact distally.  [de-identified] : Xray- 3 views of the left knee - well aligned left knee replacement in stable alignment without loosening or subsidence.  Centered patella.\par   [de-identified] : The patient is doing very well 3 weeks after left TKR. The patient will be transitioned to outpatient physical therapy and a prescription was given for that. Pain medication was renewed. The patient will continue aspirin 325 mg twice per day for DVT prophylaxis for the next 3 weeks. The importance of physical therapy and achieving full knee extension as well as progressing knee flexion was reinforced. Overall the patient is very happy with their outcome so far. Followup in 3 weeks with repeat x-rays.\par \par

## 2021-03-04 NOTE — ADDENDUM
[FreeTextEntry1] : This note was authored by Dhruv Doran working as a medical scribe for Dr. Leonardo Farrell. The note was reviewed, edited, and revised by Dr. Leonardo Farrell whom is in agreement with the exam findings, imaging findings, and treatment plan. 03/04/2021.

## 2021-03-25 ENCOUNTER — APPOINTMENT (OUTPATIENT)
Dept: ORTHOPEDIC SURGERY | Facility: CLINIC | Age: 51
End: 2021-03-25
Payer: MEDICAID

## 2021-03-25 VITALS
WEIGHT: 275 LBS | SYSTOLIC BLOOD PRESSURE: 149 MMHG | HEART RATE: 101 BPM | BODY MASS INDEX: 45.82 KG/M2 | DIASTOLIC BLOOD PRESSURE: 84 MMHG | HEIGHT: 65 IN

## 2021-03-25 PROCEDURE — 99024 POSTOP FOLLOW-UP VISIT: CPT

## 2021-03-25 PROCEDURE — 73562 X-RAY EXAM OF KNEE 3: CPT | Mod: LT

## 2021-03-25 NOTE — ADDENDUM
[FreeTextEntry1] : This note was authored by Dhruv Doran working as a medical scribe for Dr. Leonardo Farrell. The note was reviewed, edited, and revised by Dr. Leonardo Farrell whom is in agreement with the exam findings, imaging findings, and treatment plan. 03/25/2021.

## 2021-03-25 NOTE — HISTORY OF PRESENT ILLNESS
[Doing Well] : is doing well [Excellent Pain Control] : has excellent pain control [No Sign of Infection] : is showing no signs of infection [Erythema] : not erythematous [Discharge] : absent of discharge [Dehiscence] : not dehisced [de-identified] : Post op week # 6. S/P left robotic-assisted TKR with EMERSON; DOS: 2/8/21. \par  [de-identified] : NICK is a 50 year old male who presents today 6 weeks S/P left robotic-assisted TKR with EMERSON. He is ambulating and transferring well without assistive devices. He reports continuing outpatient physical therapy with good improvement of strength. He has concluded aspirin for DVT prophylaxis. He has returned to daily activities of life without significant pain or discomfort. Overall, he is very happy with the results of the surgery thus far. \par  [de-identified] : Exam of the left knee shows a well healed incision. No instability, -3 to 111 degrees of flexion measured with a goniometer. 5/5 motor strength bilaterally distally. Sensation intact distally.  [de-identified] : Xray- 3 views of the left knee - well aligned and well fixed left knee replacement. \par   [de-identified] : The patient is doing very well 6 weeks following left total knee replacement. Pain medication was renewed. The patient will continue outpatient physical therapy and gradually progress toward home exercises. Importance of knee flexion and knee extension was reinforced to the patient again. The patient was advised to gradually taper down narcotic pain medication over the next 6 weeks. They may stop taking aspirin full-strength at this point. Overall the patient is making excellent progress and is very happy with their result so far. Follow up in 6 weeks for repeat evaluation.\par \par

## 2021-05-06 ENCOUNTER — NON-APPOINTMENT (OUTPATIENT)
Age: 51
End: 2021-05-06

## 2021-05-06 ENCOUNTER — APPOINTMENT (OUTPATIENT)
Dept: ORTHOPEDIC SURGERY | Facility: CLINIC | Age: 51
End: 2021-05-06
Payer: MEDICAID

## 2021-05-06 VITALS
HEIGHT: 65 IN | HEART RATE: 71 BPM | SYSTOLIC BLOOD PRESSURE: 125 MMHG | BODY MASS INDEX: 45.82 KG/M2 | WEIGHT: 275 LBS | DIASTOLIC BLOOD PRESSURE: 83 MMHG

## 2021-05-06 DIAGNOSIS — Z47.1 AFTERCARE FOLLOWING JOINT REPLACEMENT SURGERY: ICD-10-CM

## 2021-05-06 DIAGNOSIS — Z96.659 PRESENCE OF UNSPECIFIED ARTIFICIAL KNEE JOINT: ICD-10-CM

## 2021-05-06 PROCEDURE — 73562 X-RAY EXAM OF KNEE 3: CPT | Mod: LT

## 2021-05-06 PROCEDURE — 99024 POSTOP FOLLOW-UP VISIT: CPT

## 2021-05-06 RX ORDER — OXYCODONE 5 MG/1
5 TABLET ORAL
Qty: 30 | Refills: 0 | Status: DISCONTINUED | COMMUNITY
Start: 2021-03-30 | End: 2021-05-06

## 2021-05-06 RX ORDER — OXYCODONE 5 MG/1
5 TABLET ORAL
Qty: 42 | Refills: 0 | Status: DISCONTINUED | COMMUNITY
Start: 2021-02-18 | End: 2021-05-06

## 2021-05-06 RX ORDER — OXYCODONE 5 MG/1
5 TABLET ORAL
Qty: 42 | Refills: 0 | Status: DISCONTINUED | COMMUNITY
Start: 2021-03-04 | End: 2021-05-06

## 2021-05-06 NOTE — HISTORY OF PRESENT ILLNESS
[Doing Well] : is doing well [Excellent Pain Control] : has excellent pain control [No Sign of Infection] : is showing no signs of infection [Erythema] : not erythematous [Discharge] : absent of discharge [Dehiscence] : not dehisced [de-identified] : Post op week # 6. S/P left robotic-assisted TKR with EMERSON; DOS: 2/8/21. \par  [de-identified] : NICK is a 50 year old male who presents today 3 months S/P left robotic-assisted TKR with EMERSON. He is ambulating and transferring well without assistive devices. He reports continuing outpatient physical therapy with good improvement of strength. He has returned to daily activities of life without significant pain or discomfort. Overall, he is very happy with the results of the surgery thus far. \par  [de-identified] : Exam of the left knee shows a well healed incision. No instability, a minimal effusion, -3 to 120 degrees of flexion measured with a goniometer. 5/5 motor strength bilaterally distally. Sensation intact distally.  [de-identified] : Xray- 3 views of the left knee - well aligned and well fixed left knee replacement. \par   [de-identified] : The patient is a 50 year old male 3 weeks s/p left TKR. He will continue outpatient physical therapy and gradually transition to home exercises. Overall, he is very happy with the results of the surgery. Dental prophylaxis was advised. Follow up one year post op for radiographic surveillance.

## 2021-05-06 NOTE — ADDENDUM
[FreeTextEntry1] : This note was authored by Dhruv Doran working as a medical scribe for Dr. Leonardo Farrell. The note was reviewed, edited, and revised by Dr. Leonardo Farrell whom is in agreement with the exam findings, imaging findings, and treatment plan. 05/06/2021.

## 2021-05-09 ENCOUNTER — FORM ENCOUNTER (OUTPATIENT)
Age: 51
End: 2021-05-09

## 2021-05-19 ENCOUNTER — APPOINTMENT (OUTPATIENT)
Dept: CARDIOLOGY | Facility: CLINIC | Age: 51
End: 2021-05-19

## 2021-06-16 NOTE — HISTORY OF PRESENT ILLNESS
Physical Therapy Daily Progress Note  Visit: 8    Traci King reports: My back is a little better. Pain is on average about a 3/10, it has not been too bad recently. I still have pain with lifting and prolonged standing. I feel like I can do the exercises at home.    Subjective     Objective   See Exercise, Manual, and Modality Logs for complete treatment.       Assessment & Plan     Assessment  Assessment details: Pt has made minimal progress in therapy. She continues to have low back pain and pain with prolonged positioning. Educated on deep breathing and relaxation techniques. Pt has no further questions or concerns regarding therapy at this time    Plan  Plan details: Dc at this time with education for continued HEP        Manual Therapy:    -     mins  36058;  Therapeutic Exercise:    20     mins  87463;     Neuromuscular [place]illa:    10    mins  03866;    Therapeutic Activity:     10     mins  78194;     Gait Training:      -     mins  94021;     Ultrasound:     -     mins  66024;    Electrical Stimulation:    -     mins  11502 ( );  Dry Needling     --     mins self-pay    Timed Treatment:   40   mins   Total Treatment:     45   mins    Maureen Herrera PT  [place] License #: [id number]    Physical Therapist      
[de-identified] : Patient is a 50-year-old male with osteoarthritis of the left knee.  He presents today with worsening knee pain.  Patient recently completed Euflexxa series to the left knee last injection being 11/5/2020, virtually without significant relief.  Prior to this he also had multiple rounds of steroid injections the last of which being 10/23/2020, also without significant benefit.  He notes his knee pain is localized to the medial aspect of the left knee and is worse with weight-bear activity including walking any distance, rising from a seated position, and using stairs.  He admits to intermittent swelling and stiffness of the knee as well.  Other than the injections patient is tried physical therapy without significant improvement.  He also has tried taking anti-inflammatories including Mobic without significant relief.  He presents today to discuss further treatment options.\par PMHx: borderline diabetic not on any medications.

## 2021-09-01 ENCOUNTER — APPOINTMENT (OUTPATIENT)
Dept: PULMONOLOGY | Facility: CLINIC | Age: 51
End: 2021-09-01
Payer: MEDICAID

## 2021-09-01 VITALS
OXYGEN SATURATION: 97 % | BODY MASS INDEX: 46.78 KG/M2 | WEIGHT: 274 LBS | DIASTOLIC BLOOD PRESSURE: 74 MMHG | HEIGHT: 64 IN | HEART RATE: 70 BPM | SYSTOLIC BLOOD PRESSURE: 124 MMHG | RESPIRATION RATE: 16 BRPM

## 2021-09-01 DIAGNOSIS — Z87.891 PERSONAL HISTORY OF NICOTINE DEPENDENCE: ICD-10-CM

## 2021-09-01 PROCEDURE — 99214 OFFICE O/P EST MOD 30 MIN: CPT | Mod: 25

## 2021-09-01 PROCEDURE — 99406 BEHAV CHNG SMOKING 3-10 MIN: CPT

## 2021-09-25 ENCOUNTER — TRANSCRIPTION ENCOUNTER (OUTPATIENT)
Age: 51
End: 2021-09-25

## 2021-09-30 ENCOUNTER — APPOINTMENT (OUTPATIENT)
Dept: PULMONOLOGY | Facility: CLINIC | Age: 51
End: 2021-09-30
Payer: MEDICAID

## 2021-09-30 VITALS
RESPIRATION RATE: 16 BRPM | OXYGEN SATURATION: 98 % | SYSTOLIC BLOOD PRESSURE: 120 MMHG | HEART RATE: 54 BPM | DIASTOLIC BLOOD PRESSURE: 70 MMHG

## 2021-09-30 VITALS — WEIGHT: 277 LBS | HEIGHT: 63.5 IN | BODY MASS INDEX: 48.47 KG/M2

## 2021-09-30 DIAGNOSIS — Z23 ENCOUNTER FOR IMMUNIZATION: ICD-10-CM

## 2021-09-30 DIAGNOSIS — R09.82 POSTNASAL DRIP: ICD-10-CM

## 2021-09-30 PROCEDURE — 99214 OFFICE O/P EST MOD 30 MIN: CPT | Mod: 25

## 2021-09-30 PROCEDURE — G0008: CPT

## 2021-09-30 PROCEDURE — 94010 BREATHING CAPACITY TEST: CPT

## 2021-09-30 PROCEDURE — 90686 IIV4 VACC NO PRSV 0.5 ML IM: CPT

## 2022-04-07 ENCOUNTER — APPOINTMENT (OUTPATIENT)
Dept: PULMONOLOGY | Facility: CLINIC | Age: 52
End: 2022-04-07
Payer: MEDICAID

## 2022-04-07 VITALS
SYSTOLIC BLOOD PRESSURE: 120 MMHG | WEIGHT: 278 LBS | BODY MASS INDEX: 46.32 KG/M2 | DIASTOLIC BLOOD PRESSURE: 70 MMHG | HEART RATE: 71 BPM | OXYGEN SATURATION: 97 % | RESPIRATION RATE: 16 BRPM | HEIGHT: 65 IN

## 2022-04-07 DIAGNOSIS — R06.02 SHORTNESS OF BREATH: ICD-10-CM

## 2022-04-07 DIAGNOSIS — E66.01 MORBID (SEVERE) OBESITY DUE TO EXCESS CALORIES: ICD-10-CM

## 2022-04-07 DIAGNOSIS — F17.200 NICOTINE DEPENDENCE, UNSPECIFIED, UNCOMPLICATED: ICD-10-CM

## 2022-04-07 DIAGNOSIS — R06.00 DYSPNEA, UNSPECIFIED: ICD-10-CM

## 2022-04-07 DIAGNOSIS — G47.33 OBSTRUCTIVE SLEEP APNEA (ADULT) (PEDIATRIC): ICD-10-CM

## 2022-04-07 DIAGNOSIS — Z87.891 PERSONAL HISTORY OF NICOTINE DEPENDENCE: ICD-10-CM

## 2022-04-07 PROCEDURE — 99214 OFFICE O/P EST MOD 30 MIN: CPT | Mod: 25

## 2022-04-07 PROCEDURE — 99406 BEHAV CHNG SMOKING 3-10 MIN: CPT

## 2022-04-07 RX ORDER — ACETAMINOPHEN 325 MG/1
325 TABLET, FILM COATED ORAL
Qty: 42 | Refills: 0 | Status: COMPLETED | COMMUNITY
Start: 2021-02-10 | End: 2022-04-07

## 2022-04-07 RX ORDER — OMEPRAZOLE MAGNESIUM 40 MG/1
CAPSULE, DELAYED RELEASE ORAL
Refills: 0 | Status: ACTIVE | COMMUNITY

## 2022-06-01 DIAGNOSIS — M25.561 PAIN IN RIGHT KNEE: ICD-10-CM

## 2022-06-08 ENCOUNTER — APPOINTMENT (OUTPATIENT)
Dept: ORTHOPEDIC SURGERY | Facility: CLINIC | Age: 52
End: 2022-06-08
Payer: MEDICAID

## 2022-06-08 VITALS
OXYGEN SATURATION: 99 % | WEIGHT: 274 LBS | HEART RATE: 70 BPM | SYSTOLIC BLOOD PRESSURE: 136 MMHG | BODY MASS INDEX: 45.65 KG/M2 | HEIGHT: 65 IN | DIASTOLIC BLOOD PRESSURE: 80 MMHG

## 2022-06-08 PROCEDURE — 20611 DRAIN/INJ JOINT/BURSA W/US: CPT | Mod: RT

## 2022-06-08 PROCEDURE — 73564 X-RAY EXAM KNEE 4 OR MORE: CPT | Mod: 26,RT

## 2022-06-08 PROCEDURE — 99214 OFFICE O/P EST MOD 30 MIN: CPT | Mod: 25

## 2022-06-08 NOTE — DISCUSSION/SUMMARY
[de-identified] : Patient is a 52-year-old male, status post left total knee replacement, with advanced osteoarthritis of the right knee.  Conservative options were discussed.  He received a Depo-Medrol injection with lidocaine to the right knee using sterile technique and ultrasound guidance today and tolerated well.  He will ice and elevate at home.  Euflexxa injections were also ordered for the right knee.  Patient is planning for surgery at the end of the year.  He will work on weight loss from now until then.  Follow-up once injections are approved and in office.

## 2022-06-08 NOTE — PROCEDURE
[de-identified] : Using sterile technique, 2cc of depomedrol 40mg/ml, 4cc of 1% plain lidocaine, and 2 cc 0.25% marcaine was drawn up into a sterile syringe. The right knee joint space was identified using ultrasound. The right knee was then sterilely prepped with chlorhexidine. Ethyl chloride spray was used to anesthetize the skin and subQ tissue. The depomedrol/lidocaine/marcaine mixture was then injected into the knee joint in the superolateral position under ultrasound guidance and the needle position in the joint space was confirmed. The patient tolerated the procedure well without difficulty. The patient was given instructions on the use of ice and anti-inflammatories post injection site soreness.

## 2022-06-08 NOTE — PHYSICAL EXAM
[de-identified] : Multi body exam \par The patient appears well nourished and in no apparent distress. The patient is alert and oriented to person, place, and time. Affect and mood appear normal. The head is normocephalic and atraumatic. The eyes reveal normal sclera and extra ocular muscles are intact. The tongue is midline with no apparent lesions. Skin shows normal turgor with no evidence of eczema or psoriasis. No respiratory distress noted. Sensation grossly intact.\par   [de-identified] : Exam right knee: Skin is within normal limits, there are well-healed incisions from previous arthroscopy.  There is no effusion.  Range of motion 0 to 115 degrees of flexion with pain at deep flexion.  There is medial  joint line pain. [de-identified] : X-ray 4 views right knee demonstrate advanced osteoarthritis with medial joint space narrowing

## 2022-06-09 RX ORDER — HYALURONATE SODIUM 20 MG/2 ML
20 SYRINGE (ML) INTRAARTICULAR
Qty: 1 | Refills: 0 | Status: ACTIVE | OUTPATIENT
Start: 2022-06-08

## 2022-06-10 RX ORDER — HYLAN G-F 20 16MG/2ML
16 SYRINGE (ML) INTRAARTICULAR
Qty: 1 | Refills: 0 | Status: ACTIVE | OUTPATIENT
Start: 2022-06-10

## 2022-07-20 ENCOUNTER — APPOINTMENT (OUTPATIENT)
Dept: ORTHOPEDIC SURGERY | Facility: CLINIC | Age: 52
End: 2022-07-20

## 2022-07-28 ENCOUNTER — APPOINTMENT (OUTPATIENT)
Dept: ORTHOPEDIC SURGERY | Facility: CLINIC | Age: 52
End: 2022-07-28

## 2022-07-28 VITALS — HEIGHT: 65 IN | BODY MASS INDEX: 45.65 KG/M2 | WEIGHT: 274 LBS

## 2022-07-28 DIAGNOSIS — Z96.652 PRESENCE OF LEFT ARTIFICIAL KNEE JOINT: ICD-10-CM

## 2022-07-28 PROCEDURE — 20610 DRAIN/INJ JOINT/BURSA W/O US: CPT | Mod: RT

## 2022-07-28 PROCEDURE — 99213 OFFICE O/P EST LOW 20 MIN: CPT | Mod: 25

## 2022-07-28 NOTE — PHYSICAL EXAM
[de-identified] : Multi body exam \par The patient appears well nourished and in no apparent distress. The patient is alert and oriented to person, place, and time. Affect and mood appear normal. The head is normocephalic and atraumatic. The eyes reveal normal sclera and extra ocular muscles are intact. The tongue is midline with no apparent lesions. Skin shows normal turgor with no evidence of eczema or psoriasis. No respiratory distress noted. Sensation grossly intact.\par   [de-identified] : Exam right knee: Skin is within normal limits, there are well-healed incisions from previous arthroscopy.  There is no effusion.  Range of motion 0 to 115 degrees of flexion with pain at deep flexion.  There is medial  joint line pain. [de-identified] : X-ray 4 views right knee demonstrate advanced osteoarthritis with medial joint space narrowing

## 2022-07-28 NOTE — HISTORY OF PRESENT ILLNESS
[de-identified] : Patient is a 52-year-old male status post left total knee replacement 2/8/2021, presenting with longstanding right knee pain now worsening.  His knee pain is localized medially and is worse with all weight-bear activity including walking long distance and rising from a seated position.  He denies any buckling or locking at this time.  He admits to intermittent swelling and stiffness.  Patient has tried therapy and home exercise without improvement.  He states anti-inflammatories and Tylenol without significant relief.  He  presents today for the first Synvisc injection.

## 2022-07-28 NOTE — DISCUSSION/SUMMARY
[de-identified] : Patient is a 52-year-old male, status post left total knee replacement, with advanced osteoarthritis of the right knee.  Conservative options were discussed. Patient received the first of three gel injections to the right knee using sterile technique. He tolerated well. He will ice and elevate when home. Follow up in 1 week for next injection.

## 2022-07-28 NOTE — PROCEDURE
[de-identified] : Allergies: The patient denies allergies to medications and has no allergies to chicken,eggs, or feathers.\par Procedure: The patient has been identified by name and date of birth. Patient confirms that we are treating the right knee today.\par The knee was prepped in the usual sterile fashion. The areas were cleansed with chlorhexadine, then sprayed with ethyl chloride. The patient was then injected with the Synvisc into the right knee in the anterolateral position. The patient tolerated the procedure well. The medication was delivered aseptically and atraumatically.\par Diagnosis: Osteoarthritis of the right knee\par Treatment: The patient was advised on the activities for today. I gave the patient instructions on postinjection ice and analgesia.\par

## 2022-07-28 NOTE — REASON FOR VISIT
[Follow-Up Visit] : a follow-up visit for [Other: ____] : [unfilled] [FreeTextEntry2] : Right knee Synvisc inj#1 Lot# [id number] Expires on 2023/04/30.

## 2022-08-03 ENCOUNTER — APPOINTMENT (OUTPATIENT)
Dept: ORTHOPEDIC SURGERY | Facility: CLINIC | Age: 52
End: 2022-08-03

## 2022-08-03 VITALS
DIASTOLIC BLOOD PRESSURE: 78 MMHG | BODY MASS INDEX: 45.65 KG/M2 | OXYGEN SATURATION: 99 % | SYSTOLIC BLOOD PRESSURE: 122 MMHG | WEIGHT: 274 LBS | HEIGHT: 65 IN | HEART RATE: 69 BPM

## 2022-08-03 PROCEDURE — 20610 DRAIN/INJ JOINT/BURSA W/O US: CPT | Mod: RT

## 2022-08-03 NOTE — REASON FOR VISIT
[Follow-Up Visit] : a follow-up visit for [Other: ____] : [unfilled] [FreeTextEntry2] : Right knee Synvisc Injection #2, Lot# [id number], Expires 2023/04/30

## 2022-08-10 ENCOUNTER — APPOINTMENT (OUTPATIENT)
Dept: ORTHOPEDIC SURGERY | Facility: CLINIC | Age: 52
End: 2022-08-10

## 2022-08-10 VITALS
BODY MASS INDEX: 45.65 KG/M2 | DIASTOLIC BLOOD PRESSURE: 78 MMHG | SYSTOLIC BLOOD PRESSURE: 129 MMHG | WEIGHT: 274 LBS | OXYGEN SATURATION: 98 % | HEART RATE: 56 BPM | HEIGHT: 65 IN

## 2022-08-10 DIAGNOSIS — M17.11 UNILATERAL PRIMARY OSTEOARTHRITIS, RIGHT KNEE: ICD-10-CM

## 2022-08-10 PROCEDURE — 20610 DRAIN/INJ JOINT/BURSA W/O US: CPT | Mod: RT

## 2022-08-12 NOTE — REASON FOR VISIT
[Other: ____] : [unfilled] [FreeTextEntry2] : Right knee Synvisc Injection #3, Lot# [id number], Expires 2023/04/30

## 2022-08-12 NOTE — HISTORY OF PRESENT ILLNESS
[de-identified] : The patient is here today for a Synvisc injection for the right knee. The patient is having osteoarthritic symptoms. \par Allergies: The patient denies allergies to medications and has no allergies to chicken,eggs, or feathers.\par Procedure: The patient has been identified by name and date of birth. Patient confirms that we are treating the right knee today.\par The knee was prepped in the usual sterile fashion. The areas were cleansed with chlorhexadine, then sprayed with ethyl chloride. The patient was then injected with the Synvisc into the right knee in the anterolateral position. The patient tolerated the procedure well. The medication was delivered aseptically and atraumatically.\par Diagnosis: Osteoarthritis of the right knee\par Treatment: The patient was advised on the activities for today. I gave the patient instructions on postinjection ice and analgesia.\par Follow up in 6-8 weeks.